# Patient Record
Sex: MALE | Race: OTHER | HISPANIC OR LATINO
[De-identification: names, ages, dates, MRNs, and addresses within clinical notes are randomized per-mention and may not be internally consistent; named-entity substitution may affect disease eponyms.]

---

## 2021-01-10 ENCOUNTER — FORM ENCOUNTER (OUTPATIENT)
Age: 31
End: 2021-01-10

## 2021-01-11 ENCOUNTER — TRANSCRIPTION ENCOUNTER (OUTPATIENT)
Age: 31
End: 2021-01-11

## 2021-01-11 PROBLEM — Z00.00 ENCOUNTER FOR PREVENTIVE HEALTH EXAMINATION: Status: ACTIVE | Noted: 2021-01-11

## 2021-01-14 ENCOUNTER — APPOINTMENT (OUTPATIENT)
Dept: DISASTER EMERGENCY | Facility: HOSPITAL | Age: 31
End: 2021-01-14

## 2021-01-14 ENCOUNTER — OUTPATIENT (OUTPATIENT)
Dept: OUTPATIENT SERVICES | Facility: HOSPITAL | Age: 31
LOS: 1 days | End: 2021-01-14
Payer: MEDICAID

## 2021-01-14 VITALS
OXYGEN SATURATION: 100 % | DIASTOLIC BLOOD PRESSURE: 68 MMHG | SYSTOLIC BLOOD PRESSURE: 108 MMHG | RESPIRATION RATE: 16 BRPM | HEART RATE: 76 BPM | TEMPERATURE: 97 F

## 2021-01-14 VITALS
TEMPERATURE: 98 F | RESPIRATION RATE: 17 BRPM | DIASTOLIC BLOOD PRESSURE: 69 MMHG | OXYGEN SATURATION: 100 % | HEIGHT: 69 IN | SYSTOLIC BLOOD PRESSURE: 107 MMHG | WEIGHT: 179.9 LBS | HEART RATE: 78 BPM

## 2021-01-14 DIAGNOSIS — U07.1 COVID-19: ICD-10-CM

## 2021-01-14 PROCEDURE — M0243: CPT

## 2021-01-14 NOTE — CHART NOTE - NSCHARTNOTEFT_GEN_A_CORE
Medicine NP note    CC: Monoclonal Antibody Infusion /COVID 19 Positive    30y F with pmhx of Seizures, Aids, on immunosupresive therapy,  presented for Monoclonal antibody treatment with Casirivimab and Imdevimab combination    Exam/findings:    Vital Signs Last 24 Hrs  T(C): 36.6 (14 Jan 2021 10:40), Max: 36.6 (14 Jan 2021 10:40)  T(F): 97.8 (14 Jan 2021 10:40), Max: 97.8 (14 Jan 2021 10:40)  HR: 70 (14 Jan 2021 10:40) (70 - 78)  BP: 106/70 (14 Jan 2021 10:40) (106/70 - 107/69)  BP(mean): --  RR: 16 (14 Jan 2021 10:40) (16 - 17)  SpO2: 100% (14 Jan 2021 10:40) (100% - 100%)    PE:   Appearance:  A&ox3, calm and cooperative	  HEENT:   Normal oral mucosa,   Lymphatic: No lymphadenopathy  Cardiovascular: Normal S1 S2, No JVD, No murmurs, No edema  Respiratory: Lungs clear to auscultation	  Gastrointestinal:  Soft, Non-tender, + BS	  Skin: warm and dry  Neurologic: Non-focal  Extremities: Normal range of motion B/L U/L extremities    ASSESSMENT:    7    Risk Profile includes: Patient High risk for covid 19 progression in setting of age >55    S/S: Cough, fever,  malaise, sore throat, soft BP     Covid 19 result positive on 01/05/2021    Plan:   - infusion procedure explained to patient   -Consent for monoclonal antibody infusion obtained  - Risk & benefits discussed/all questions answered  -infuse  Casirivimab and IMdevimab 1200mg/1200mg (total of 2400mg) IV over one hour   -observe patient for one hour post infusion    I have reviewed the Casirivimab and IMdevimab combination. Emergency Use Authorization (EUA) and I have provided the patient or patient's caregiver with the following information:    1. FDA has authorized emergency use of Casirivimab and IMdevimab combination., which is not an FDA-approved biological product.  2. The patient or patient's caregiver has the option to accept or refuse administration of Casirivimab and IMdevimab combination.   3. The significant known and potential risks and benefits of Casirivimab and IMdevimab combination. and the extent to which such risks and benefits are unknown.  4. Information on available alternative treatments and risks and benefits of those alternatives.      Discharge:     Patient tolerated infusion well, denies complaints of chest pain /SOB/dizziness/ palpitations  VSS stable for D/C home  D/C instructions given /fact sheet included  Patient to follow up with PMD as needed.    Red Virgen [facility]  Department of Medicine  [phone].      Electronic Signatures:  Red Virgen (JERSON)  (Signed 07-Jan-2021 18:48)  	Authored: Note Type, Note      Last Updated: 07-Jan-2021 18:48 by Red Virgen (JERSON) Medicine NP note    CC: Monoclonal Antibody Infusion /COVID 19 Positive    30y Male  with pmhx of Seizures, HIV, Aids, on immunosuppressive therapy,  presented for Monoclonal antibody treatment with Casirivimab and Imdevimab combination    Exam/findings:    Vital Signs Last 24 Hrs  T(C): 36.6 (14 Jan 2021 10:40), Max: 36.6 (14 Jan 2021 10:40)  T(F): 97.8 (14 Jan 2021 10:40), Max: 97.8 (14 Jan 2021 10:40)  HR: 70 (14 Jan 2021 10:40) (70 - 78)  BP: 106/70 (14 Jan 2021 10:40) (106/70 - 107/69)  BP(mean): --  RR: 16 (14 Jan 2021 10:40) (16 - 17)  SpO2: 100% (14 Jan 2021 10:40) (100% - 100%)    PE:   Appearance:  A&ox3, calm and cooperative	  HEENT:   Normal oral mucosa,   Lymphatic: No lymphadenopathy  Cardiovascular: Normal S1 S2, No JVD, No murmurs, No edema  Respiratory: Lungs clear to auscultation	  Gastrointestinal:  Soft, Non-tender, + BS	  Skin: warm and dry  Neurologic: Non-focal  Extremities: Normal range of motion B/L U/L extremities    ASSESSMENT:    30y Male  with pmhx of Seizures, HIV, Aids, on immunosuppressive therapy,  presented for Monoclonal antibody treatment with Casirivimab and Imdevimab combination    Risk Profile includes: Patient High risk for covid 19 progression in setting of AIDS, on immunosupressive therapy    S/S: Cough,  malaise     Covid 19 result positive on 01/08/2021, result reviewed, COvid 19 positive result in all sripts.    Plan:   - infusion procedure explained to patient   -Consent for monoclonal antibody infusion obtained  - Risk & benefits discussed/all questions answered  -infuse  Casirivimab and IMdevimab 1200mg/1200mg (total of 2400mg) IV over one hour   -observe patient for one hour post infusion    I have reviewed the Casirivimab and IMdevimab combination. Emergency Use Authorization (EUA) and I have provided the patient or patient's caregiver with the following information:    1. FDA has authorized emergency use of Casirivimab and IMdevimab combination., which is not an FDA-approved biological product.  2. The patient or patient's caregiver has the option to accept or refuse administration of Casirivimab and IMdevimab combination.   3. The significant known and potential risks and benefits of Casirivimab and IMdevimab combination. and the extent to which such risks and benefits are unknown.  4. Information on available alternative treatments and risks and benefits of those alternatives.    Discharge:     Patient tolerated infusion well, denies complaints of chest pain /SOB/dizziness/ palpitations  VSS stable for D/C home  D/C instructions given /fact sheet included  Patient to follow up with PMD as needed.    Red Virgen [facility]  Department of Medicine  [phone]. Medicine NP note    CC: Monoclonal Antibody Infusion /COVID 19 Positive    30y Male  with pmhx of Seizures, HIV, Aids, on immunosuppressive therapy, referred by Dr. Michael,  presented for Monoclonal antibody treatment with Casirivimab and Imdevimab combination    Exam/findings:    Vital Signs Last 24 Hrs  T(C): 36.6 (14 Jan 2021 11:15), Max: 36.6 (14 Jan 2021 10:40)  T(F): 97.8 (14 Jan 2021 11:15), Max: 97.8 (14 Jan 2021 10:40)  HR: 70 (14 Jan 2021 11:15) (70 - 88)  BP: 100/65 (14 Jan 2021 11:15) (100/65 - 112/81)  BP(mean): --  RR: 17 (14 Jan 2021 11:15) (16 - 17)  SpO2: 98% (14 Jan 2021 11:15) (98% - 100%)    PE:   Appearance:  A&ox3, calm and cooperative	  HEENT:   Normal oral mucosa,   Lymphatic: No lymphadenopathy  Cardiovascular: Normal S1 S2, No JVD, No murmurs, No edema  Respiratory: Lungs clear to auscultation	  Gastrointestinal:  Soft, Non-tender, + BS	  Skin: warm and dry  Neurologic: Non-focal  Extremities: Normal range of motion B/L U/L extremities    ASSESSMENT:    30y Male  with pmhx of Seizures, HIV, Aids, on immunosuppressive therapy, referred by Dr. Michael,  presented for Monoclonal antibody treatment with Casirivimab and Imdevimab combination    Risk Profile includes: Patient High risk for covid 19 progression in setting of AIDS, on immunosupressive therapy    S/S: Cough,  malaise     Covid 19 result positive on 01/08/2021, result reviewed, COvid 19 positive result in all sripts.    Plan:   - infusion procedure explained to patient   -Consent for monoclonal antibody infusion obtained  - Risk & benefits discussed/all questions answered  -infuse  Casirivimab and IMdevimab 1200mg/1200mg (total of 2400mg) IV over one hour   -observe patient for one hour post infusion    I have reviewed the Casirivimab and IMdevimab combination. Emergency Use Authorization (EUA) and I have provided the patient or patient's caregiver with the following information:    1. FDA has authorized emergency use of Casirivimab and IMdevimab combination., which is not an FDA-approved biological product.  2. The patient or patient's caregiver has the option to accept or refuse administration of Casirivimab and IMdevimab combination.   3. The significant known and potential risks and benefits of Casirivimab and IMdevimab combination. and the extent to which such risks and benefits are unknown.  4. Information on available alternative treatments and risks and benefits of those alternatives.    Discharge:     Patient tolerated infusion well, denies complaints of chest pain /SOB/dizziness/ palpitations  VSS stable for D/C home  D/C instructions given /fact sheet included  Patient to follow up with PMD as needed.    Red Virgen [facility]  Department of Medicine  [phone]. Medicine NP note    CC: Monoclonal Antibody Infusion /COVID 19 Positive    30y Male  with pmhx of Seizures, HIV, Aids, on immunosuppressive therapy, referred by Dr. Michael,  presented for Monoclonal antibody treatment with Casirivimab and Imdevimab combination    Exam/findings:    Vital Signs Last 24 Hrs  T(C): 36.6 (14 Jan 2021 11:15), Max: 36.6 (14 Jan 2021 10:40)  T(F): 97.8 (14 Jan 2021 11:15), Max: 97.8 (14 Jan 2021 10:40)  HR: 70 (14 Jan 2021 11:15) (70 - 88)  BP: 100/65 (14 Jan 2021 11:15) (100/65 - 112/81)  BP(mean): --  RR: 17 (14 Jan 2021 11:15) (16 - 17)  SpO2: 98% (14 Jan 2021 11:15) (98% - 100%)    PE:   Appearance:  A&ox3, calm and cooperative	  HEENT:   Normal oral mucosa,   Lymphatic: No lymphadenopathy  Cardiovascular: Normal S1 S2, No JVD, No murmurs, No edema  Respiratory: Lungs clear to auscultation	  Gastrointestinal:  Soft, Non-tender, + BS	  Skin: warm and dry  Neurologic: Non-focal  Extremities: Normal range of motion B/L U/L extremities    ASSESSMENT:    30y Male  with pmhx of Seizures, HIV, Aids, on immunosuppressive therapy, referred by Dr. Michael,  presented for Monoclonal antibody treatment with Casirivimab and Imdevimab combination    Risk Profile includes: Patient High risk for covid 19 progression in setting of AIDS, on immunosupressive therapy    S/S: Cough,  malaise     Covid 19 result positive on 01/08/2021, result reviewed, COvid 19 positive result in allscripts    Plan:   - infusion procedure explained to patient   -Consent for monoclonal antibody infusion obtained  - Risk & benefits discussed/all questions answered  -infuse  Casirivimab and IMdevimab 1200mg/1200mg (total of 2400mg) IV over one hour   -observe patient for one hour post infusion    I have reviewed the Casirivimab and IMdevimab combination. Emergency Use Authorization (EUA) and I have provided the patient or patient's caregiver with the following information:    1. FDA has authorized emergency use of Casirivimab and IMdevimab combination., which is not an FDA-approved biological product.  2. The patient or patient's caregiver has the option to accept or refuse administration of Casirivimab and IMdevimab combination.   3. The significant known and potential risks and benefits of Casirivimab and IMdevimab combination. and the extent to which such risks and benefits are unknown.  4. Information on available alternative treatments and risks and benefits of those alternatives.    Discharge:     Patient tolerated infusion well, denies complaints of chest pain /SOB/dizziness/ palpitations  VSS stable for D/C home  D/C instructions given /fact sheet included  Patient to follow up with PMD as needed.    Red Virgen [facility]  Department of Medicine  [phone]. Medicine NP note    CC: Monoclonal Antibody Infusion /COVID 19 Positive    30y Male  with pmhx of Seizures, HIV, Aids, on immunosuppressive therapy, referred by Dr. Michael,  presented for Monoclonal antibody treatment with Casirivimab and Imdevimab combination.  Pt symptomatic with cough, malaise.    Exam/findings:    Vital Signs Last 24 Hrs  T(C): 36.6 (14 Jan 2021 11:15), Max: 36.6 (14 Jan 2021 10:40)  T(F): 97.8 (14 Jan 2021 11:15), Max: 97.8 (14 Jan 2021 10:40)  HR: 70 (14 Jan 2021 11:15) (70 - 88)  BP: 100/65 (14 Jan 2021 11:15) (100/65 - 112/81)  BP(mean): --  RR: 17 (14 Jan 2021 11:15) (16 - 17)  SpO2: 98% (14 Jan 2021 11:15) (98% - 100%)    PE:   Appearance:  A&ox3, calm and cooperative	  HEENT:   Normal oral mucosa,   Lymphatic: No lymphadenopathy  Cardiovascular: Normal S1 S2, No JVD, No murmurs, No edema  Respiratory: Lungs clear to auscultation	  Gastrointestinal:  Soft, Non-tender, + BS	  Skin: warm and dry  Neurologic: Non-focal  Extremities: Normal range of motion B/L U/L extremities    ASSESSMENT:    30y Male  with pmhx of Seizures, HIV, Aids, on immunosuppressive therapy, referred by Dr. Michael,  presented for Monoclonal antibody treatment with Casirivimab and Imdevimab combination    Risk Profile includes: Patient High risk for covid 19 progression in setting of AIDS, on immunosupressive therapy    S/S: Cough,  malaise     Covid 19 result positive on 01/08/2021, result reviewed, COvid 19 positive result in allscripts, Hard copy of results in pt's physical chart.    Plan:   - infusion procedure explained to patient   -Consent for monoclonal antibody infusion obtained  - Risk & benefits discussed/all questions answered  -infuse  Casirivimab and IMdevimab 1200mg/1200mg (total of 2400mg) IV over one hour   -observe patient for one hour post infusion    I have reviewed the Casirivimab and IMdevimab combination. Emergency Use Authorization (EUA) and I have provided the patient or patient's caregiver with the following information:    1. FDA has authorized emergency use of Casirivimab and IMdevimab combination., which is not an FDA-approved biological product.  2. The patient or patient's caregiver has the option to accept or refuse administration of Casirivimab and IMdevimab combination.   3. The significant known and potential risks and benefits of Casirivimab and IMdevimab combination. and the extent to which such risks and benefits are unknown.  4. Information on available alternative treatments and risks and benefits of those alternatives.    Discharge:     Patient tolerated infusion well, denies complaints of chest pain /SOB/dizziness/ palpitations  VSS stable for D/C home  D/C instructions given /fact sheet included  Patient to follow up with PMD as needed.    Red Virgen [facility]  Department of Medicine  [phone]. Medicine NP note    CC: Monoclonal Antibody Infusion /COVID 19 Positive    30y Male  with pmhx of Seizures, HIV, Aids, on immunosuppressive therapy, referred by Dr. Michael,  presented for Monoclonal antibody treatment with Casirivimab and Imdevimab combination.    Pt symptomatic with cough, malaise.    Exam/findings:    Vital Signs Last 24 Hrs  T(C): 36.6 (14 Jan 2021 11:15), Max: 36.6 (14 Jan 2021 10:40)  T(F): 97.8 (14 Jan 2021 11:15), Max: 97.8 (14 Jan 2021 10:40)  HR: 70 (14 Jan 2021 11:15) (70 - 88)  BP: 100/65 (14 Jan 2021 11:15) (100/65 - 112/81)  BP(mean): --  RR: 17 (14 Jan 2021 11:15) (16 - 17)  SpO2: 98% (14 Jan 2021 11:15) (98% - 100%)    PE:   Appearance:  A&ox3, calm and cooperative	  HEENT:   Normal oral mucosa,   Lymphatic: No lymphadenopathy  Cardiovascular: Normal S1 S2, No JVD, No murmurs, No edema  Respiratory: Lungs clear to auscultation	  Gastrointestinal:  Soft, Non-tender, + BS	  Skin: warm and dry  Neurologic: Non-focal  Extremities: Normal range of motion B/L U/L extremities    ASSESSMENT:    30y Male  with pmhx of Seizures, HIV, Aids, on immunosuppressive therapy, referred by Dr. Michael,  presented for Monoclonal antibody treatment with Casirivimab and Imdevimab combination    Risk Profile includes: Patient High risk for covid 19 progression in setting of AIDS, on immunosupressive therapy    S/S: Cough,  malaise, symptoms satrted 5 days ago    Covid 19 result positive on 01/08/2021, result reviewed, COvid 19 positive result in allscripts, Hard copy of results in pt's physical chart.    Plan:   - infusion procedure explained to patient   -Consent for monoclonal antibody infusion obtained  - Risk & benefits discussed/all questions answered  -infuse  Casirivimab and IMdevimab 1200mg/1200mg (total of 2400mg) IV over one hour   -observe patient for one hour post infusion    I have reviewed the Casirivimab and IMdevimab combination. Emergency Use Authorization (EUA) and I have provided the patient or patient's caregiver with the following information:    1. FDA has authorized emergency use of Casirivimab and IMdevimab combination., which is not an FDA-approved biological product.  2. The patient or patient's caregiver has the option to accept or refuse administration of Casirivimab and IMdevimab combination.   3. The significant known and potential risks and benefits of Casirivimab and IMdevimab combination. and the extent to which such risks and benefits are unknown.  4. Information on available alternative treatments and risks and benefits of those alternatives.    Discharge: 12:44 PM    Patient tolerated infusion well, denies complaints of chest pain /SOB/dizziness/ palpitations  VSS stable for D/C home  D/C instructions given /fact sheet included  Patient to follow up with PMD as needed.    Red Virgen [facility]  Department of Medicine  [phone].

## 2021-01-15 ENCOUNTER — TRANSCRIPTION ENCOUNTER (OUTPATIENT)
Age: 31
End: 2021-01-15

## 2021-01-20 ENCOUNTER — TRANSCRIPTION ENCOUNTER (OUTPATIENT)
Age: 31
End: 2021-01-20

## 2024-03-04 ENCOUNTER — APPOINTMENT (OUTPATIENT)
Dept: NEUROLOGY | Facility: HOSPITAL | Age: 34
End: 2024-03-04

## 2024-03-04 ENCOUNTER — TRANSCRIPTION ENCOUNTER (OUTPATIENT)
Age: 34
End: 2024-03-04

## 2024-03-04 ENCOUNTER — INPATIENT (INPATIENT)
Facility: HOSPITAL | Age: 34
LOS: 3 days | Discharge: HOME | DRG: 24 | End: 2024-03-08
Attending: SPECIALIST | Admitting: SPECIALIST
Payer: MEDICAID

## 2024-03-04 VITALS
RESPIRATION RATE: 15 BRPM | OXYGEN SATURATION: 100 % | DIASTOLIC BLOOD PRESSURE: 71 MMHG | HEART RATE: 69 BPM | TEMPERATURE: 98 F | SYSTOLIC BLOOD PRESSURE: 105 MMHG

## 2024-03-04 DIAGNOSIS — I63.9 CEREBRAL INFARCTION, UNSPECIFIED: ICD-10-CM

## 2024-03-04 LAB
ANION GAP SERPL CALC-SCNC: 12 MMOL/L — SIGNIFICANT CHANGE UP (ref 5–17)
BUN SERPL-MCNC: 12.7 MG/DL — SIGNIFICANT CHANGE UP (ref 8–20)
CALCIUM SERPL-MCNC: 8.3 MG/DL — LOW (ref 8.4–10.5)
CHLORIDE SERPL-SCNC: 103 MMOL/L — SIGNIFICANT CHANGE UP (ref 96–108)
CO2 SERPL-SCNC: 24 MMOL/L — SIGNIFICANT CHANGE UP (ref 22–29)
CREAT SERPL-MCNC: 0.94 MG/DL — SIGNIFICANT CHANGE UP (ref 0.5–1.3)
EGFR: 111 ML/MIN/1.73M2 — SIGNIFICANT CHANGE UP
GLUCOSE BLDC GLUCOMTR-MCNC: 100 MG/DL — HIGH (ref 70–99)
GLUCOSE BLDC GLUCOMTR-MCNC: 94 MG/DL — SIGNIFICANT CHANGE UP (ref 70–99)
GLUCOSE SERPL-MCNC: 98 MG/DL — SIGNIFICANT CHANGE UP (ref 70–99)
POTASSIUM SERPL-MCNC: 3.6 MMOL/L — SIGNIFICANT CHANGE UP (ref 3.5–5.3)
POTASSIUM SERPL-SCNC: 3.6 MMOL/L — SIGNIFICANT CHANGE UP (ref 3.5–5.3)
SODIUM SERPL-SCNC: 139 MMOL/L — SIGNIFICANT CHANGE UP (ref 135–145)

## 2024-03-04 PROCEDURE — 61645 PERQ ART M-THROMBECT &/NFS: CPT | Mod: RT

## 2024-03-04 PROCEDURE — 76377 3D RENDER W/INTRP POSTPROCES: CPT | Mod: 26

## 2024-03-04 PROCEDURE — 70460 CT HEAD/BRAIN W/DYE: CPT | Mod: 26

## 2024-03-04 RX ORDER — POTASSIUM CHLORIDE 20 MEQ
40 PACKET (EA) ORAL ONCE
Refills: 0 | Status: COMPLETED | OUTPATIENT
Start: 2024-03-04 | End: 2024-03-04

## 2024-03-04 RX ORDER — EMTRICITABINE 200 MG/1
200 CAPSULE ORAL AT BEDTIME
Refills: 0 | Status: DISCONTINUED | OUTPATIENT
Start: 2024-03-04 | End: 2024-03-04

## 2024-03-04 RX ORDER — SODIUM CHLORIDE 9 MG/ML
1000 INJECTION INTRAMUSCULAR; INTRAVENOUS; SUBCUTANEOUS ONCE
Refills: 0 | Status: COMPLETED | OUTPATIENT
Start: 2024-03-04 | End: 2024-03-04

## 2024-03-04 RX ORDER — DARUNAVIR ETHANOLATE AND COBICISTAT 800; 150 MG/1; MG/1
1 TABLET, FILM COATED ORAL AT BEDTIME
Refills: 0 | Status: DISCONTINUED | OUTPATIENT
Start: 2024-03-04 | End: 2024-03-05

## 2024-03-04 RX ORDER — INSULIN LISPRO 100/ML
VIAL (ML) SUBCUTANEOUS EVERY 6 HOURS
Refills: 0 | Status: DISCONTINUED | OUTPATIENT
Start: 2024-03-04 | End: 2024-03-05

## 2024-03-04 RX ORDER — LEVETIRACETAM 250 MG/1
1000 TABLET, FILM COATED ORAL EVERY 12 HOURS
Refills: 0 | Status: DISCONTINUED | OUTPATIENT
Start: 2024-03-04 | End: 2024-03-05

## 2024-03-04 RX ORDER — EMTRICITABINE AND TENOFOVIR DISOPROXIL FUMARATE 200; 300 MG/1; MG/1
1 TABLET, FILM COATED ORAL AT BEDTIME
Refills: 0 | Status: DISCONTINUED | OUTPATIENT
Start: 2024-03-04 | End: 2024-03-05

## 2024-03-04 RX ORDER — DEXTROSE 50 % IN WATER 50 %
25 SYRINGE (ML) INTRAVENOUS ONCE
Refills: 0 | Status: DISCONTINUED | OUTPATIENT
Start: 2024-03-04 | End: 2024-03-05

## 2024-03-04 RX ORDER — DEXTROSE 50 % IN WATER 50 %
12.5 SYRINGE (ML) INTRAVENOUS ONCE
Refills: 0 | Status: DISCONTINUED | OUTPATIENT
Start: 2024-03-04 | End: 2024-03-05

## 2024-03-04 RX ORDER — TENOFOVIR DISOPROXIL FUMARATE 300 MG/1
25 TABLET, FILM COATED ORAL AT BEDTIME
Refills: 0 | Status: DISCONTINUED | OUTPATIENT
Start: 2024-03-04 | End: 2024-03-04

## 2024-03-04 RX ORDER — LACOSAMIDE 50 MG/1
150 TABLET ORAL
Refills: 0 | Status: DISCONTINUED | OUTPATIENT
Start: 2024-03-04 | End: 2024-03-05

## 2024-03-04 RX ORDER — CHLORHEXIDINE GLUCONATE 213 G/1000ML
1 SOLUTION TOPICAL
Refills: 0 | Status: DISCONTINUED | OUTPATIENT
Start: 2024-03-04 | End: 2024-03-08

## 2024-03-04 RX ADMIN — EMTRICITABINE AND TENOFOVIR DISOPROXIL FUMARATE 1 TABLET(S): 200; 300 TABLET, FILM COATED ORAL at 23:18

## 2024-03-04 RX ADMIN — Medication 40 MILLIEQUIVALENT(S): at 23:18

## 2024-03-04 RX ADMIN — SODIUM CHLORIDE 1000 MILLILITER(S): 9 INJECTION INTRAMUSCULAR; INTRAVENOUS; SUBCUTANEOUS at 23:55

## 2024-03-04 RX ADMIN — DARUNAVIR ETHANOLATE AND COBICISTAT 1 TABLET(S): 800; 150 TABLET, FILM COATED ORAL at 23:18

## 2024-03-04 NOTE — CHART NOTE - NSCHARTNOTEFT_GEN_A_CORE
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 Mouna MD   Fellow: Dr. Tani DO  Physician Assistant:  Bailey Ahuja  Nurse: Radha Parr  Anesthesiologist: Dr Purvis                                           Radiology Tech: Jimbo Ford        Sheath: 6 - [place] Sheath       I/Os: estimated blood loss less than 50cc,  IV fluids    400cc,   Urine out put    cc,   Contrast: Omnipaque 240     cc, Fluoro Time:         Vitals :  /74  HR 67  Spo2  100%         Preliminary Report:     Under a 6 [place]ian long sheath via the right groin under general anesthesia  a selective cerebral angiography  was performed of the right internal carotid artery and reveals R Distal M1 Occlusion s/p TICI 3 Recanalization with 1 Aspiration pass    Right groin sheath was discontinued at 18:47    Hemostasis was obtained with approximately 13 minutes of manual compression.      No active bleeding, no hematoma, no ecchymosis was noted following manual compression and quick clot and safeguard balloon dressing applied at 19:00    Patient transferred to the NSCU in stable condition.   Patient tolerated procedure well.  Patient remains hemodynamically stable, NIH post procedure registering at 2  Results were discussed with patient and patient's family.

## 2024-03-04 NOTE — DISCHARGE NOTE NURSING/CASE MANAGEMENT/SOCIAL WORK - PATIENT PORTAL LINK FT
You can access the FollowMyHealth Patient Portal offered by [facility] by registering at the following website: [email or web address]. By joining [email or web address] FollowMyHealth portal, you will also be able to view your health information using other applications (apps) compatible with our system.

## 2024-03-04 NOTE — PATIENT PROFILE ADULT - FALL HARM RISK - HARM RISK INTERVENTIONS

## 2024-03-04 NOTE — H&P ADULT - ASSESSMENT
ASSESSMENT/PLAN:    NEURO:  Neuro/Vital checks per TNK/Thrombectomy protocol  CT AM and @ 1437 for 24hr TNK scan  MR @ somepoint  Keppra 1g BID, Vimpat 150mg BID  Pain control- PRN  Activity: [x] OOB as tolerated [] Bedrest [x] PT [x] OT [] PMNR    PULM: RA  Incentive Spirometry as tolerated    CV:  SBP goal:     RENAL:  Fluids: NS @ 60    GI:  Diet: NPO except meds  GI prophylaxis [x] not indicated [] PPI [] other:  Bowel regimen [] colace [x] senna [] other: miralax    ENDO:   Goal euglycemia (-180)  ISS    HEME/ONC:  VTE prophylaxis: [x] SCDs [x] hold chemoprophylaxis for TNK    ID: HIV + on Symtuza, Do not stock will be giving medication individually, tenofavir dose is 10mg in combination, spoke with pharmacy about only having 25mg, will give 25mg until patient can present own medication for adminstration    Afebrile  Monitor Fever Curve    SOCIAL/FAMILY:  [x] updated at bedside [] family meeting    CODE STATUS:  [x] Full Code [] DNR [] DNI [] Palliative/Comfort Care    DISPOSITION:  [x] ICU [] Stroke Unit [] Floor [] EMU [] RCU [] PCU    [x] Patient is at high risk of neurologic deterioration/death due to: Cerebral edema, Cerebral herniation    Time seen: 60  Time spent: 45 [x] critical care minutes

## 2024-03-04 NOTE — CHART NOTE - NSCHARTNOTEFT_GEN_A_CORE
Neurointerventional Surgery  Pre-Procedure Note     HPI:    This is a 33y right hand dominant Male with prior history of stroke who collapsed in the parking lot [facility] [place] [facility] earlier this afternoon. Patient suffered left sided hemiparesis and was registering an NIH of 15 that prompted TNK administration. On imaging patient was found to have a 1cm  focal nonocclusive thrombus versus severe focal stenosis in the proximal M1 segment of the R MCA and 1 cm severe focal stenosis of the distal R M1. There is suspected tandem LVO in the R ICA and MCA. Patient to undergo emergent mechanical thrombectomy with Dr. Argueta on the evening of 3/4/24. Consent obtained from Chela (patients wife).      Allergies: No Known Allergies      PMH/PSH:  PAST MEDICAL & SURGICAL HISTORY:      Social History:   Social History:    FAMILY HISTORY:      Current Medications:       Physical Exam:  Constitutional: NAD    Neuro  * Mental Status:  GCS 15:  E(4), V(5), M(6).  Awake, alert, oriented to conversation.  * Cranial Nerves: L Facial droop. PERRL, EOMI, tongue midline, no gaze deviation  * Motor: RUE 4/5, LUE 4/5, RLE 3/5, LLE 5/5  * Sensory: Sensation diminished to light touch on the Left  * Reflexes: Not assessed  * Gait: Not assessed    Cardiovascular:  S1, S2 no murmurs appreciated.  Regular rate and rhythm.  Eyes: See neurologic examination with detailed examination of eyes.  ENT: No JVD, Trachea Midline.  Respiratory: Clear to auscultation.  Gastrointestinal: Soft, nontender, nondistended.  Genitourinary: [ ] Ventura, [ x ] No Ventura.   Musculoskeletal: No muscle wasting noted,  No pretibial edema appreciated, no appreciable calf tenderness.  Skin:  Wound inspected, no redness, bleeding or drainage noted.    Hematologic / Lymph / Immunologic: No bleeding from IV sites or wounds, No lymphadenopathy, No Hives or allergic type skin lesions      NIH SS: 14  DATE: 3/4/24  TIME: 18:12  1A: Level of consciousness (0-3): 0  1B: Questions (0-2): 0    1C: Commands (0-2): 0  2: Gaze (0-2): 1  3: Visual fields (0-3): 2  4: Facial palsy (0-3): 2  MOTOR:  5A: Left arm motor drift (0-4): 1  5B: Right arm motor drift (0-4): 1  6A: Left leg motor drift (0-4): 2  6B: Right leg motor drift (0-4): 1  7: Limb ataxia (0-2): 1  SENSORY:  8: Sensation (0-2): 1  SPEECH:  9: Language (0-3): 0  10: Dysarthria (0-2): 1  EXTINCTION:  11: Extinction/inattention (0-2): 1    TOTAL SCORE:       Assessment/Plan:   This is a 33y  year old right hand dominant Male  who presents to neuro-IR for selective cerebral angiography.     Procedure, goals, risks, benefits and alternatives  were discussed with patient.  All questions were answered. Risks include but are not limited to stroke, vessel injury, hemorrhage, and or groin hematoma.  Patient and patients wife demonstrates understanding  of all risks involved with this procedure and wishes to continue.  Appropriate consent was obtained from patient and consent is in the patient's chart.

## 2024-03-04 NOTE — H&P ADULT - HISTORY OF PRESENT ILLNESS
HPI: 33 year old male PMHx of Seizure disorder, previous CVA, HIV was AOX3 in [facility] emergency room awaiting transfer to [facility] for cva management after seizure in parking lot of  [facility] witnessed by security ( supported and placed on ground by same-no fall/trauma) patient found on awakening to have left sided paralysis , cat scan showed stroke/2 large vessel occlusions received TNK by [facility], prior to transfer patient moving left side arm and leg weakly now    Pre-MRS-0  GCS 15  NIHSS: 14  DATE: 3/4/24  TIME: 18:12  1A: Level of consciousness (0-3): 0  1B: Questions (0-2): 0  1C: Commands (0-2): 0  2: Gaze (0-2): 1  3: Visual fields (0-3): 2  4: Facial palsy (0-3): 2  MOTOR:  5A: Left arm motor drift (0-4): 1  5B: Right arm motor drift (0-4): 1  6A: Left leg motor drift (0-4): 2  6B: Right leg motor drift (0-4): 1  7: Limb ataxia (0-2): 1  SENSORY:  8: Sensation (0-2): 1  SPEECH:  9: Language (0-3): 0  10: Dysarthria (0-2): 1  EXTINCTION:  11: Extinction/inattention (0-2): 1     TOTAL SCORE: 14    INTERVAL HPI/OVERNIGHT EVENTS:  POD 0 TICI 3 thrombectomy. TNK @ 14:37    Vital Signs Last 24 Hrs  T(C): 36.7 (04 Mar 2024 19:21), Max: 36.7 (04 Mar 2024 19:21)  T(F): 98 (04 Mar 2024 19:21), Max: 98 (04 Mar 2024 19:21)  HR: 67 (04 Mar 2024 20:30) (67 - 75)  BP: 96/60 (04 Mar 2024 20:30) (96/60 - 107/60)  BP(mean): 70 (04 Mar 2024 20:30) (67 - 82)  RR: 14 (04 Mar 2024 20:30) (12 - 19)  SpO2: 100% (04 Mar 2024 20:30) (100% - 100%)    Parameters below as of 04 Mar 2024 20:00  Patient On (Oxygen Delivery Method): nasal cannula        PHYSICAL EXAM:  GENERAL: no acute distress  HEAD:  Atraumatic, normocephalic  NEURO:  AOx3, FC, TRAORE spont AG  CHEST/LUNG: Clear to auscultation bilaterally; no rales, rhonchi, wheezing, or rubs  ABDOMEN: Soft, nontender, nondistended;   EXTREMITIES:  2+ peripheral pulses, no clubbing, cyanosis, or edema  SKIN: Warm, dry; no rashes or lesions      LABS:    03-04    139  |  103  |  12.7  ----------------------------<  98  3.6   |  24.0  |  0.94    Ca    8.3<L>      04 Mar 2024 20:05        Urinalysis Basic - ( 04 Mar 2024 20:05 )    Color: x / Appearance: x / SG: x / pH: x  Gluc: 98 mg/dL / Ketone: x  / Bili: x / Urobili: x   Blood: x / Protein: x / Nitrite: x   Leuk Esterase: x / RBC: x / WBC x   Sq Epi: x / Non Sq Epi: x / Bacteria: x            RADIOLOGY & ADDITIONAL TESTS:

## 2024-03-05 ENCOUNTER — RESULT REVIEW (OUTPATIENT)
Age: 34
End: 2024-03-05

## 2024-03-05 DIAGNOSIS — I63.9 CEREBRAL INFARCTION, UNSPECIFIED: ICD-10-CM

## 2024-03-05 LAB
A1C WITH ESTIMATED AVERAGE GLUCOSE RESULT: 4.5 % — SIGNIFICANT CHANGE UP (ref 4–5.6)
ANION GAP SERPL CALC-SCNC: 11 MMOL/L — SIGNIFICANT CHANGE UP (ref 5–17)
BUN SERPL-MCNC: 9.3 MG/DL — SIGNIFICANT CHANGE UP (ref 8–20)
CALCIUM SERPL-MCNC: 8.4 MG/DL — SIGNIFICANT CHANGE UP (ref 8.4–10.5)
CHLORIDE SERPL-SCNC: 105 MMOL/L — SIGNIFICANT CHANGE UP (ref 96–108)
CO2 SERPL-SCNC: 23 MMOL/L — SIGNIFICANT CHANGE UP (ref 22–29)
CREAT SERPL-MCNC: 0.82 MG/DL — SIGNIFICANT CHANGE UP (ref 0.5–1.3)
EGFR: 120 ML/MIN/1.73M2 — SIGNIFICANT CHANGE UP
ESTIMATED AVERAGE GLUCOSE: 82 MG/DL — SIGNIFICANT CHANGE UP (ref 68–114)
FSP PPP-MCNC: < 5 UG/ML — SIGNIFICANT CHANGE UP
GLUCOSE BLDC GLUCOMTR-MCNC: 95 MG/DL — SIGNIFICANT CHANGE UP (ref 70–99)
GLUCOSE SERPL-MCNC: 95 MG/DL — SIGNIFICANT CHANGE UP (ref 70–99)
HCT VFR BLD CALC: 33.8 % — LOW (ref 39–50)
HCT VFR BLD CALC: 36 % — LOW (ref 39–50)
HGB BLD-MCNC: 11.9 G/DL — LOW (ref 13–17)
HGB BLD-MCNC: 12.7 G/DL — LOW (ref 13–17)
HMPV RNA SPEC QL NAA+PROBE: DETECTED
MAGNESIUM SERPL-MCNC: 1.9 MG/DL — SIGNIFICANT CHANGE UP (ref 1.6–2.6)
MAGNESIUM SERPL-MCNC: 2.3 MG/DL — SIGNIFICANT CHANGE UP (ref 1.6–2.6)
MCHC RBC-ENTMCNC: 32.1 PG — SIGNIFICANT CHANGE UP (ref 27–34)
MCHC RBC-ENTMCNC: 32.2 PG — SIGNIFICANT CHANGE UP (ref 27–34)
MCHC RBC-ENTMCNC: 35.2 GM/DL — SIGNIFICANT CHANGE UP (ref 32–36)
MCHC RBC-ENTMCNC: 35.3 GM/DL — SIGNIFICANT CHANGE UP (ref 32–36)
MCV RBC AUTO: 91.1 FL — SIGNIFICANT CHANGE UP (ref 80–100)
MCV RBC AUTO: 91.1 FL — SIGNIFICANT CHANGE UP (ref 80–100)
MRSA PCR RESULT.: SIGNIFICANT CHANGE UP
PHOSPHATE SERPL-MCNC: 3.8 MG/DL — SIGNIFICANT CHANGE UP (ref 2.4–4.7)
PHOSPHATE SERPL-MCNC: 3.9 MG/DL — SIGNIFICANT CHANGE UP (ref 2.4–4.7)
PLATELET # BLD AUTO: 177 K/UL — SIGNIFICANT CHANGE UP (ref 150–400)
PLATELET # BLD AUTO: 189 K/UL — SIGNIFICANT CHANGE UP (ref 150–400)
POTASSIUM SERPL-MCNC: 3.9 MMOL/L — SIGNIFICANT CHANGE UP (ref 3.5–5.3)
POTASSIUM SERPL-SCNC: 3.9 MMOL/L — SIGNIFICANT CHANGE UP (ref 3.5–5.3)
RAPID RVP RESULT: DETECTED
RBC # BLD: 3.71 M/UL — LOW (ref 4.2–5.8)
RBC # BLD: 3.95 M/UL — LOW (ref 4.2–5.8)
RBC # FLD: 12 % — SIGNIFICANT CHANGE UP (ref 10.3–14.5)
RBC # FLD: 12.1 % — SIGNIFICANT CHANGE UP (ref 10.3–14.5)
S AUREUS DNA NOSE QL NAA+PROBE: SIGNIFICANT CHANGE UP
SARS-COV-2 RNA SPEC QL NAA+PROBE: SIGNIFICANT CHANGE UP
SODIUM SERPL-SCNC: 139 MMOL/L — SIGNIFICANT CHANGE UP (ref 135–145)
T3 SERPL-MCNC: 98 NG/DL — SIGNIFICANT CHANGE UP (ref 80–200)
T4 AB SER-ACNC: 6.6 UG/DL — SIGNIFICANT CHANGE UP (ref 4.5–12)
TSH SERPL-MCNC: 4.24 UIU/ML — HIGH (ref 0.27–4.2)
WBC # BLD: 5.34 K/UL — SIGNIFICANT CHANGE UP (ref 3.8–10.5)
WBC # BLD: 5.35 K/UL — SIGNIFICANT CHANGE UP (ref 3.8–10.5)
WBC # FLD AUTO: 5.34 K/UL — SIGNIFICANT CHANGE UP (ref 3.8–10.5)
WBC # FLD AUTO: 5.35 K/UL — SIGNIFICANT CHANGE UP (ref 3.8–10.5)

## 2024-03-05 PROCEDURE — 93306 TTE W/DOPPLER COMPLETE: CPT | Mod: 26

## 2024-03-05 PROCEDURE — 93010 ELECTROCARDIOGRAM REPORT: CPT

## 2024-03-05 PROCEDURE — 99255 IP/OBS CONSLTJ NEW/EST HI 80: CPT

## 2024-03-05 PROCEDURE — 93356 MYOCRD STRAIN IMG SPCKL TRCK: CPT

## 2024-03-05 PROCEDURE — 71045 X-RAY EXAM CHEST 1 VIEW: CPT | Mod: 26

## 2024-03-05 PROCEDURE — 70553 MRI BRAIN STEM W/O & W/DYE: CPT | Mod: 26

## 2024-03-05 PROCEDURE — 99222 1ST HOSP IP/OBS MODERATE 55: CPT | Mod: 25

## 2024-03-05 PROCEDURE — 99223 1ST HOSP IP/OBS HIGH 75: CPT | Mod: GC

## 2024-03-05 PROCEDURE — 99291 CRITICAL CARE FIRST HOUR: CPT

## 2024-03-05 PROCEDURE — 93970 EXTREMITY STUDY: CPT | Mod: 26

## 2024-03-05 PROCEDURE — 70450 CT HEAD/BRAIN W/O DYE: CPT | Mod: 26

## 2024-03-05 RX ORDER — DORAVIRINE 100 MG/1
100 TABLET, FILM COATED ORAL DAILY
Refills: 0 | Status: DISCONTINUED | OUTPATIENT
Start: 2024-03-05 | End: 2024-03-08

## 2024-03-05 RX ORDER — LACOSAMIDE 50 MG/1
1 TABLET ORAL
Refills: 0 | DISCHARGE

## 2024-03-05 RX ORDER — LEVETIRACETAM 250 MG/1
1000 TABLET, FILM COATED ORAL DAILY
Refills: 0 | Status: DISCONTINUED | OUTPATIENT
Start: 2024-03-06 | End: 2024-03-06

## 2024-03-05 RX ORDER — PANTOPRAZOLE SODIUM 20 MG/1
40 TABLET, DELAYED RELEASE ORAL
Refills: 0 | Status: DISCONTINUED | OUTPATIENT
Start: 2024-03-05 | End: 2024-03-07

## 2024-03-05 RX ORDER — ASPIRIN/CALCIUM CARB/MAGNESIUM 324 MG
81 TABLET ORAL DAILY
Refills: 0 | Status: DISCONTINUED | OUTPATIENT
Start: 2024-03-05 | End: 2024-03-06

## 2024-03-05 RX ORDER — POTASSIUM CHLORIDE 20 MEQ
20 PACKET (EA) ORAL ONCE
Refills: 0 | Status: COMPLETED | OUTPATIENT
Start: 2024-03-05 | End: 2024-03-05

## 2024-03-05 RX ORDER — SODIUM CHLORIDE 9 MG/ML
10 INJECTION INTRAMUSCULAR; INTRAVENOUS; SUBCUTANEOUS ONCE
Refills: 0 | Status: COMPLETED | OUTPATIENT
Start: 2024-03-05 | End: 2024-03-05

## 2024-03-05 RX ORDER — SODIUM CHLORIDE 9 MG/ML
500 INJECTION INTRAMUSCULAR; INTRAVENOUS; SUBCUTANEOUS ONCE
Refills: 0 | Status: COMPLETED | OUTPATIENT
Start: 2024-03-05 | End: 2024-03-05

## 2024-03-05 RX ORDER — SODIUM CHLORIDE 9 MG/ML
1000 INJECTION INTRAMUSCULAR; INTRAVENOUS; SUBCUTANEOUS
Refills: 0 | Status: DISCONTINUED | OUTPATIENT
Start: 2024-03-06 | End: 2024-03-06

## 2024-03-05 RX ORDER — ACETAMINOPHEN 500 MG
975 TABLET ORAL EVERY 6 HOURS
Refills: 0 | Status: DISCONTINUED | OUTPATIENT
Start: 2024-03-05 | End: 2024-03-08

## 2024-03-05 RX ORDER — ENOXAPARIN SODIUM 100 MG/ML
40 INJECTION SUBCUTANEOUS
Refills: 0 | Status: DISCONTINUED | OUTPATIENT
Start: 2024-03-05 | End: 2024-03-06

## 2024-03-05 RX ORDER — POLYETHYLENE GLYCOL 3350 17 G/17G
17 POWDER, FOR SOLUTION ORAL DAILY
Refills: 0 | Status: DISCONTINUED | OUTPATIENT
Start: 2024-03-05 | End: 2024-03-08

## 2024-03-05 RX ORDER — LEVETIRACETAM 250 MG/1
1000 TABLET, FILM COATED ORAL DAILY
Refills: 0 | Status: DISCONTINUED | OUTPATIENT
Start: 2024-03-05 | End: 2024-03-05

## 2024-03-05 RX ORDER — LACOSAMIDE 50 MG/1
150 TABLET ORAL
Refills: 0 | Status: DISCONTINUED | OUTPATIENT
Start: 2024-03-05 | End: 2024-03-08

## 2024-03-05 RX ORDER — BENZOCAINE AND MENTHOL 5; 1 G/100ML; G/100ML
1 LIQUID ORAL
Refills: 0 | Status: DISCONTINUED | OUTPATIENT
Start: 2024-03-05 | End: 2024-03-08

## 2024-03-05 RX ORDER — DARUNAVIR, COBICISTAT, EMTRICITABINE, AND TENOFOVIR ALAFENAMIDE 800; 150; 200; 10 MG/1; MG/1; MG/1; MG/1
1 TABLET, FILM COATED ORAL DAILY
Refills: 0 | Status: DISCONTINUED | OUTPATIENT
Start: 2024-03-05 | End: 2024-03-08

## 2024-03-05 RX ORDER — LEVETIRACETAM 250 MG/1
1500 TABLET, FILM COATED ORAL AT BEDTIME
Refills: 0 | Status: DISCONTINUED | OUTPATIENT
Start: 2024-03-05 | End: 2024-03-06

## 2024-03-05 RX ORDER — MAGNESIUM SULFATE 500 MG/ML
2 VIAL (ML) INJECTION ONCE
Refills: 0 | Status: COMPLETED | OUTPATIENT
Start: 2024-03-05 | End: 2024-03-05

## 2024-03-05 RX ORDER — LEVETIRACETAM 250 MG/1
1500 TABLET, FILM COATED ORAL AT BEDTIME
Refills: 0 | Status: DISCONTINUED | OUTPATIENT
Start: 2024-03-05 | End: 2024-03-05

## 2024-03-05 RX ORDER — SENNA PLUS 8.6 MG/1
2 TABLET ORAL AT BEDTIME
Refills: 0 | Status: DISCONTINUED | OUTPATIENT
Start: 2024-03-05 | End: 2024-03-08

## 2024-03-05 RX ORDER — SODIUM CHLORIDE 9 MG/ML
1000 INJECTION INTRAMUSCULAR; INTRAVENOUS; SUBCUTANEOUS
Refills: 0 | Status: DISCONTINUED | OUTPATIENT
Start: 2024-03-05 | End: 2024-03-05

## 2024-03-05 RX ORDER — LEVETIRACETAM 250 MG/1
0 TABLET, FILM COATED ORAL
Refills: 0 | DISCHARGE

## 2024-03-05 RX ADMIN — CHLORHEXIDINE GLUCONATE 1 APPLICATION(S): 213 SOLUTION TOPICAL at 05:05

## 2024-03-05 RX ADMIN — LACOSAMIDE 150 MILLIGRAM(S): 50 TABLET ORAL at 17:18

## 2024-03-05 RX ADMIN — DORAVIRINE 100 MILLIGRAM(S): 100 TABLET, FILM COATED ORAL at 21:03

## 2024-03-05 RX ADMIN — Medication 975 MILLIGRAM(S): at 10:23

## 2024-03-05 RX ADMIN — SODIUM CHLORIDE 1000 MILLILITER(S): 9 INJECTION INTRAMUSCULAR; INTRAVENOUS; SUBCUTANEOUS at 14:02

## 2024-03-05 RX ADMIN — Medication 20 MILLIEQUIVALENT(S): at 05:05

## 2024-03-05 RX ADMIN — SENNA PLUS 2 TABLET(S): 8.6 TABLET ORAL at 21:02

## 2024-03-05 RX ADMIN — SODIUM CHLORIDE 10 MILLILITER(S): 9 INJECTION INTRAMUSCULAR; INTRAVENOUS; SUBCUTANEOUS at 10:25

## 2024-03-05 RX ADMIN — Medication 25 GRAM(S): at 01:36

## 2024-03-05 RX ADMIN — LEVETIRACETAM 1500 MILLIGRAM(S): 250 TABLET, FILM COATED ORAL at 21:05

## 2024-03-05 RX ADMIN — Medication 975 MILLIGRAM(S): at 02:26

## 2024-03-05 RX ADMIN — ENOXAPARIN SODIUM 40 MILLIGRAM(S): 100 INJECTION SUBCUTANEOUS at 21:04

## 2024-03-05 RX ADMIN — Medication 975 MILLIGRAM(S): at 09:23

## 2024-03-05 RX ADMIN — DARUNAVIR, COBICISTAT, EMTRICITABINE, AND TENOFOVIR ALAFENAMIDE 1 TABLET(S): 800; 150; 200; 10 TABLET, FILM COATED ORAL at 21:03

## 2024-03-05 RX ADMIN — Medication 975 MILLIGRAM(S): at 01:29

## 2024-03-05 RX ADMIN — LACOSAMIDE 150 MILLIGRAM(S): 50 TABLET ORAL at 05:06

## 2024-03-05 RX ADMIN — BENZOCAINE AND MENTHOL 1 LOZENGE: 5; 1 LIQUID ORAL at 05:32

## 2024-03-05 RX ADMIN — LEVETIRACETAM 1000 MILLIGRAM(S): 250 TABLET, FILM COATED ORAL at 05:06

## 2024-03-05 NOTE — CONSULT NOTE ADULT - ATTENDING COMMENTS
Patient seen and examined. Case discussed with Dr. Duran. Agree with recommendations.     Rehab/Impaired mobility and function - Patient continues to require hospitalization for the above diagnoses and ongoing active management of comorbid complications (ICU level care, therapy evals, CVA workup) that are substantially impairing functional ability and impairing quality of life.     RECOMMEND - OOB daily       When medically optimized, based on the patient's examination, expect patient to achieve functional goals for DC HOME.      Will continue to follow. Rehab recommendations are dependent on how functional progress changes as well as how patient continues to participate and tolerate therapeutic interventions, which may change. Recommend ongoing mobilization by staff to maintain cardiopulmonary function and prevention of secondary complications related to debility. Discussed the specific management and recommendations above with rehab clinical care team/rehab liaison.      Total Time Spent on Encounter (reviewing clinical notes, labs, radiology, medications, patient history/exam, assessment and plan) - 75 minutes

## 2024-03-05 NOTE — PROGRESS NOTE ADULT - ASSESSMENT
Assessment:  32 y/o M with acute CVA due to R ICA terminus and RM1and RM2 occlusion, s/p TNK and TICI 3 thrombectomy. Etiology - most likely cardioembolic. MRI reviewed - small-moderate R MCA territory stroke noted, no signs of hemorrhagic transversion.   PMH of remote CVA with L eye visual field deficit, epilepsy on Vimpat and Keppra, HIV.  Cardiomyopathy with aneurysmal apex, borderline LV EF; intracardiac mobile thrombus (apex).  Normocytic anemia, stable.    Plan:  -neurochecks q1h for now  -high risk of recurrent stroke in view of mobile intracardiac thrombus in the context of small-moderate size of acute stroke, > 24 hrs after stroke onset and TNK administration; after discussion with patient himself, we all agree that there's a high risk of recurrent, potentially disabling/life-threatening stroke, and benefit of AC initiation is considered lower than the risk of its potential complications; starting Heparin ggt - no bolus, PTT goal 60-80;  -obtain hypercoag w/up and coags prior to Heparin administration   -stability CTh in am  -hold ASA for now  -Cardiology involved  -rest of the management per daytime team

## 2024-03-05 NOTE — CONSULT NOTE ADULT - SUBJECTIVE AND OBJECTIVE BOX
HPI:  33yM w/ PMH seizure disorder, CVA, and HIV (on Symtuza), who suffered [facility] collapse [facility] 03/04/2024. Found to have right ICA and distal M1 occlusion. TNK given. Transferred to [facility]. S/p mechanical thrombectomy w/ TICI 3. PM&R consulted 03/05 for rehab disposition.    Imaging Reviewed Today:  CT head 03/05  ----------------------------------------------------------------------------------------  CHIEF COMPLAINT          ----------------------------------------------------------------------------------------  VITALS  T(C): 36.8 (03-05-24 @ 07:34), Max: 36.9 (03-05-24 @ 00:03)  HR: 75 (03-05-24 @ 07:00) (62 - 81)  BP: 94/60 (03-05-24 @ 07:00) (83/59 - 107/60)  RR: 18 (03-05-24 @ 07:00) (11 - 19)  SpO2: 97% (03-05-24 @ 07:00) (94% - 100%)  Wt(kg): --    PAST MEDICAL & SURGICAL HISTORY        LABS  REVIEWED    CBC Full  -  ( 05 Mar 2024 03:10 )  WBC Count : 5.34 K/uL  RBC Count : 3.71 M/uL  Hemoglobin : 11.9 g/dL  Hematocrit : 33.8 %  Platelet Count - Automated : 177 K/uL  Mean Cell Volume : 91.1 fl  Mean Cell Hemoglobin : 32.1 pg  Mean Cell Hemoglobin Concentration : 35.2 gm/dL    139  |  105  |  9.3  ----------------------------<  95  3.9   |  23.0  |  0.82    Ca    8.4      05 Mar 2024 03:10  Phos  3.8     03-05  Mg     2.3     03-05    CT head 03/05: No evidence of acute transcortical infarct, acute intracranial hemorrhage, or mass effect.      ALLERGIES  No Known Allergies      MEDICATIONS   acetaminophen     Tablet .. 975 milliGRAM(s) Oral every 6 hours PRN  benzocaine/menthol Lozenge 1 Lozenge Oral every 3 hours PRN  chlorhexidine 2% Cloths 1 Application(s) Topical <User Schedule>  darunavir 800 mG/cobicstat 150 mG 1 Tablet(s) Oral at bedtime  dextrose 50% Injectable 25 Gram(s) IV Push once  dextrose 50% Injectable 25 Gram(s) IV Push once  dextrose 50% Injectable 12.5 Gram(s) IV Push once  emtricitabine 200 mG/tenofovir alafenamide 25 mG (DESCOVY) Tablet 1 Tablet(s) Oral at bedtime  insulin lispro (ADMELOG) corrective regimen sliding scale   SubCutaneous every 6 hours  lacosamide 150 milliGRAM(s) Oral two times a day  levETIRAcetam   Injectable 1000 milliGRAM(s) IV Push every 12 hours  polyethylene glycol 3350 17 Gram(s) Oral daily  senna 2 Tablet(s) Oral at bedtime  sodium chloride 0.9%. 1000 milliLiter(s) IV Continuous <Continuous>  ----------------------------------------------------------------------------------------  FUNCTIONAL HISTORY  Lives   Independent    CURRENT FUNCTIONAL STATUS  Therapy evals pending.   ----------------------------------------------------------------------------------------  PHYSICAL EXAM  Constitutional - NAD, Comfortable  HEENT - NCAT, EOMI  Neck - Supple, No limited ROM  Chest - Breathing comfortably, No wheezing  Cardiovascular - S1S2   Abdomen - Soft   Extremities - No C/C/E, No calf tenderness   Neurologic Exam -                    Cognitive - AAO to self, place, date, year, situation     Communication - Fluent, No dysarthria     Cranial Nerves - CN 2-12 intact     FUNCTIONAL MOTOR EXAM - No focal deficits                    LEFT    UE - ShAB 5/5, EF 5/5, EE 5/5, WE 5/5,  5/5                    RIGHT UE - ShAB 5/5, EF 5/5, EE 5/5, WE 5/5,  5/5                    LEFT    LE - HF 5/5, KE 5/5, DF 5/5, PF 5/5                    RIGHT LE - HF 5/5, KE 5/5, DF 5/5, PF 5/5        Sensory - Intact to LT     Reflexes - DTR Intact, No primitive reflexive     Coordination - FTN intact     OculoVestibular - No saccades, No nystagmus, VOR         Balance - WNL Static  Psychiatric - Mood stable, Affect WNL  ----------------------------------------------------------------------------------------  ASSESSMENT/PLAN  33yMale w/ PMH CVA, HIV, seizure disorder, who presents w/ functional deficits after right MCA CVA.  Pain - acetaminophen PRN  Seizure disorder - levetiracetam IV, lacosamide  HIV - closest equivalent of Symtuza until home med available  DVT PPX - SCDs (s/p TNK)  Impaired Mobility/Function/Rehab Recommendations -  HPI:  33yM w/ PMH seizure disorder and HIV (on Symtuza), who was driven to [facility] 03/04/2024 by girlfriend for decreased responsiveness and left-sided weakness and numbness. Suffered witnessed collapse followed by seizure in [facility] parking lot. Found to have right ICA and distal M1 occlusion. TNK given. Transferred to [facility]. S/p mechanical thrombectomy w/ TICI 3. PM&R consulted 03/05 for rehab disposition.    Imaging Reviewed Today:  CT head 03/05  ----------------------------------------------------------------------------------------  CHIEF COMPLAINT  Seen at bedside with multiple family members present at this time.  Headache tolerated on acetaminophen.  Residual numbness to left pinky overnight appears to be improving.  Reports no prior h/o CVA.  ----------------------------------------------------------------------------------------  VITALS  T(C): 36.8 (03-05-24 @ 07:34), Max: 36.9 (03-05-24 @ 00:03)  HR: 75 (03-05-24 @ 07:00) (62 - 81)  BP: 94/60 (03-05-24 @ 07:00) (83/59 - 107/60)  RR: 18 (03-05-24 @ 07:00) (11 - 19)  SpO2: 97% (03-05-24 @ 07:00) (94% - 100%)  Wt(kg): --    PAST MEDICAL & SURGICAL HISTORY        LABS  REVIEWED    CBC Full  -  ( 05 Mar 2024 03:10 )  WBC Count : 5.34 K/uL  RBC Count : 3.71 M/uL  Hemoglobin : 11.9 g/dL  Hematocrit : 33.8 %  Platelet Count - Automated : 177 K/uL  Mean Cell Volume : 91.1 fl  Mean Cell Hemoglobin : 32.1 pg  Mean Cell Hemoglobin Concentration : 35.2 gm/dL    139  |  105  |  9.3  ----------------------------<  95  3.9   |  23.0  |  0.82    Ca    8.4      05 Mar 2024 03:10  Phos  3.8     03-05  Mg     2.3     03-05    CT head 03/05: No evidence of acute transcortical infarct, acute intracranial hemorrhage, or mass effect.      ALLERGIES  No Known Allergies    MEDICATIONS   acetaminophen     Tablet .. 975 milliGRAM(s) Oral every 6 hours PRN  benzocaine/menthol Lozenge 1 Lozenge Oral every 3 hours PRN  chlorhexidine 2% Cloths 1 Application(s) Topical <User Schedule>  darunavir 800 mG/cobicstat 150 mG 1 Tablet(s) Oral at bedtime  dextrose 50% Injectable 25 Gram(s) IV Push once  dextrose 50% Injectable 25 Gram(s) IV Push once  dextrose 50% Injectable 12.5 Gram(s) IV Push once  emtricitabine 200 mG/tenofovir alafenamide 25 mG (DESCOVY) Tablet 1 Tablet(s) Oral at bedtime  insulin lispro (ADMELOG) corrective regimen sliding scale   SubCutaneous every 6 hours  lacosamide 150 milliGRAM(s) Oral two times a day  levETIRAcetam   Injectable 1000 milliGRAM(s) IV Push every 12 hours  polyethylene glycol 3350 17 Gram(s) Oral daily  senna 2 Tablet(s) Oral at bedtime  sodium chloride 0.9%. 1000 milliLiter(s) IV Continuous <Continuous>  ----------------------------------------------------------------------------------------  FUNCTIONAL HISTORY  Lives with girlfriend. Flight up to bedroom; however, 1st-floor set-up available.  Independent for all ADLs and ambulation w/o AD.  Works in [employer] during warm months. Active with children and dogs.    CURRENT FUNCTIONAL STATUS  Therapy evals pending.   ----------------------------------------------------------------------------------------  PHYSICAL EXAM  Constitutional - NAD, Comfortable  HEENT - NCAT  Neck - Supple, No limited ROM  Chest - Breathing comfortably on room air  Extremities - No C/C/E, No calf tenderness   Neurologic Exam -                    Cognitive - AAO to self, place, situation     Communication - Fluent, No dysarthria     Cranial Nerves - PERRLA, chronic left visual peripheral field deficit, EOMI, decreased sensation to LT of lower right face, left low face weakness, b/l hearing intact, minimal left tongue deviation, b/l shrug intact     FUNCTIONAL MOTOR EXAM - No focal deficits                    LEFT    UE - ShAB 5/5, EF 5/5, EE 5/5, WE 5/5,  5/5                    RIGHT UE - ShAB 5/5, EF 5/5, EE 5/5, WE 5/5,  5/5                    LEFT    LE - HF 5/5, KE 5/5, DF 5/5, PF 5/5                    RIGHT LE - HF 5/5, KE 5/5, DF 5/5, PF 5/5        Sensory - Intact to LT of extremities     Coordination - Rapid finger movements intact  Psychiatric - Mood stable, Affect WNL  ----------------------------------------------------------------------------------------  ASSESSMENT/PLAN  33yMale w/ PMH HIV and seizure disorder, who presents w/ functional deficits after right MCA CVA s/p TNK + mechanical thrombectomy w/ TICI 3.  Pain - acetaminophen PRN  Seizure disorder - levetiracetam IV, lacosamide  HIV - closest equivalent of Symtuza until home med available  DVT PPX - SCDs (s/p TNK)  Impaired Mobility/Function/Rehab Recommendations - Requires continued acute hospitalization for aforementioned medical issues and work-up. Will benefit from therapy evaluations to determine functional status and potential rehabilitation needs. Continue regular mobilization to prevent further debility. Based on exam today, suspect patient may be candidate to go home.    PM&R will continue to follow along and offer recommendations.

## 2024-03-05 NOTE — PROGRESS NOTE ADULT - ASSESSMENT
33 year old male PMHx of Seizure disorder, previous CVA, HIV was AOX3 in [facility] emergency room awaiting transfer to [facility] for cva management after seizure in parking lot of [facility] witnessed by security ( supported and placed on ground by same-no fall/trauma) patient found on awakening to have left sided paralysis , cat scan showed stroke/2 large vessel occlusions received TNK by [facility], prior to transfer patient with NIH of 15. POD 1 of Mechanical thrombectomy with 1 aspiration pass of distal R M1 occlusion. Patient currently with NIH of 2 for longstanding LHH. Patient otherwise in no acute distress. Groin soft and nontender.      Recommendations:    Vitals and Neurochecks q2h until 24 hr trey of TNK adminstration  Aspirin 81mg qd po at 24 hr trey of TNK admistration  MRI Brain w/o  TTE, RICH, Stroke in young workup  Cardiology consult  Groin checks  SS/PT/OT

## 2024-03-05 NOTE — OCCUPATIONAL THERAPY INITIAL EVALUATION ADULT - MUSCLE TONE ASSESSMENT, REHAB EVAL
bilateral upper extremities/normal [Bone Metastasis] : bone metastasis [Consideration for Non-Curative Therapy] : consideration for non-curative therapy for [Prostate Cancer] : prostate cancer

## 2024-03-05 NOTE — PROGRESS NOTE ADULT - SUBJECTIVE AND OBJECTIVE BOX
NIGHTTIME ROUNDS    Recent events: TTE results noted with mobile LV thrombus.   Patient remains neurologically stable.    Available labs, vitals, imaging reviewed.    Exam:  AO x4, FC, participates in conversation, asks appropriate questions, comprehension seems intact, speech clear, EO spont, face symmetric, tongue midline, EOMI, PERRLA, Left homonymous hemianopsia present (reported as old), motor - TRAORE strongly, with no drift.  Sensation intact to LT.  CTAB  S1S2 present  Abd soft, NT, ND  No peripheral edema. R groin with no signs of hematoma or active bleeding. Pulses present.

## 2024-03-05 NOTE — PROGRESS NOTE ADULT - SUBJECTIVE AND OBJECTIVE BOX
Chief complaint:   Patient is a 33y old  Male who presents with a chief complaint of CVA (05 Mar 2024 08:17)    HPI:  HPI: 33 year old male PMHx of Seizure disorder, previous CVA, HIV was AOX3 in [facility] emergency room awaiting transfer to [facility] for cva management after seizure in parking lot of  [facility] witnessed by security ( supported and placed on ground by same-no fall/trauma) patient found on awakening to have left sided paralysis , cat scan showed stroke/2 large vessel occlusions received TNK by [facility], prior to transfer patient moving left side arm and leg weakly now    PHYSICAL EXAM:  General: Calm  HEENT: MMM  Neuro:  -Mental status- No acute distress  -CN- PERRL 3mm, EOMI, tongue midline, L facial  mild R hemiparesis  L VF deficit from prior stroke    CV: RRR  Pulm: clear to auscultation  Abd: Soft, nontender, nondistended  Ext: no noted edema in lower ext  Skin: warm, dry    ------------------------------------------------------------------------------------------------------  ICU Vital Signs Last 24 Hrs  T(C): 36.7 (05 Mar 2024 12:09), Max: 36.9 (05 Mar 2024 00:03)  T(F): 98.1 (05 Mar 2024 12:09), Max: 98.5 (05 Mar 2024 00:03)  HR: 81 (05 Mar 2024 12:00) (62 - 81)  BP: 122/58 (05 Mar 2024 12:00) (83/59 - 122/58)  BP(mean): 79 (05 Mar 2024 12:00) (65 - 82)  ABP: --  ABP(mean): --  RR: 14 (05 Mar 2024 12:00) (11 - 19)  SpO2: 98% (05 Mar 2024 12:00) (94% - 100%)    O2 Parameters below as of 05 Mar 2024 12:00  Patient On (Oxygen Delivery Method): room air            I&O's Summary    04 Mar 2024 07:01  -  05 Mar 2024 07:00  --------------------------------------------------------  IN: 1470 mL / OUT: 1750 mL / NET: -280 mL    05 Mar 2024 07:01  -  05 Mar 2024 12:48  --------------------------------------------------------  IN: 570 mL / OUT: 850 mL / NET: -280 mL        MEDICATIONS  (STANDING):  chlorhexidine 2% Cloths 1 Application(s) Topical <User Schedule>  darunavir 800 mG/cobicistat 150 mG/emtricitabine 200 mG/tenofovir alafenamide 10 mG (SYMTUZA) 1 Tablet(s) Oral daily  doravirine 100 milliGRAM(s) Oral daily  lacosamide 150 milliGRAM(s) Oral two times a day  levETIRAcetam   Injectable 1500 milliGRAM(s) IV Push at bedtime  pantoprazole    Tablet 40 milliGRAM(s) Oral before breakfast  polyethylene glycol 3350 17 Gram(s) Oral daily  senna 2 Tablet(s) Oral at bedtime      RESPIRATORY:      IMAGING:   Recent imaging studies were reviewed.    LAB RESULTS:                          11.9   5.34  )-----------( 177      ( 05 Mar 2024 03:10 )             33.8           03-05    139  |  105  |  9.3  ----------------------------<  95  3.9   |  23.0  |  0.82    Ca    8.4      05 Mar 2024 03:10  Phos  3.8     03-05  Mg     2.3     03-05

## 2024-03-05 NOTE — PROGRESS NOTE ADULT - ATTENDING COMMENTS
Agree with [place] assessment and plan.  Neurologically back at his baseline with left sided hemianopsia  Echo showed thrombus - appreciate NSICU and stroke neurology input.

## 2024-03-05 NOTE — PROGRESS NOTE ADULT - ASSESSMENT
ASSESSMENT/PLAN:  33M HIV with CVA    Patient is critically ill due to the following diagnoses:  CVA sp TNK    NEURO:  Neuro/Vital checks per TNK/Thrombectomy protocol  CT for 24hr TNK scan  Start ASA/DVT ppx after 24h CTH  MRI wo  Continue home AEDs (Keppra 1/1.5g AM/PM, Vimpat 150mg BID)  TTE  Lipid Panel, start Atorvastatin    PULM: RA  Incentive Spirometry as tolerated    CV:  SBP goal:     RENAL:  Fluids: NS @ 60    GI:  Start Diet  GI prophylaxis [x] not indicated [] PPI [] other:  Bowel regimen [] colace [x] senna [] other: miralax    ENDO:   Goal euglycemia (-180)  ISS    HEME/ONC:  VTE prophylaxis: [x] SCDs [x] hold chemoprophylaxis for TNK    ID:  Cont home ART   hMPV (+), precautions per Infection Control    My full attention was spent providing medically necessary critical care to the patient with details documented in my note above.   Critical care time spent examining patient, reviewing vitals, labs, medications, imaging and discussing with the team goals of care   The combined critical care time provided to the patient was 60 minutes  This time does not include bedside procedures that are documented separately.

## 2024-03-05 NOTE — CONSULT NOTE ADULT - PROBLEM SELECTOR RECOMMENDATION 9
- Acute R MCA occlusion s/p TNK and mechanical thrombectomy.   - Start aspirin and statin when cleared by Neurology.   - Would check a hypercoagulable workup.   - LE venous duplex.   - Echocardiogram pending.   - NPO after midnight.   - Plan for RICH tomorrow.   - If RICH is unrevealing, and hypercoagulable workup is negative, would plan for ILR on day of discharge.   - Continue telemetry monitoring.

## 2024-03-05 NOTE — CONSULT NOTE ADULT - NS ATTEND AMEND GEN_ALL_CORE FT
NEUROVASCULAR SERVICE: ATTENDING ATTESTATION NOTE    I saw and evaluated the patient on 3/5/24.  I discussed the case with the Neurovascular PA/NP and agree with PA's/NP's history, examination findings and treatment plan as documented in the Neurovascular PA's/NP's note with the following additions and changes:    Interval History:  32 y/o M with prior hx of Epilepsy on Vimpat and Keppra, presented with Left sided weakness/neglect, found to have a ICA terminus and RM1and RM2 thrombus, received IV TNK, and transferred here for thrombectomy--TICI 3 for RM1 thrombectomy. Patient states no fam hx of known blood clots or MI or Strokes.  States his vision issues started at same time as he developed seizures--told it was due to a cyst--?neurocysticercosis?  Never told he had a stroke, nor was he on aspirin.      Examination is as documented in the PA/NP's note.  Additional relevant examination findings include Left Homonymous Hemianopia    I reviewed the data in the Neurovascular PA/NP's note including vital signs, radiographic data and procedural data.  I independently reviewed the images of the following studies: CT head, CTA    Assessment: 32 y/o M with prior hx of Epilepsy on Vimpat and Keppra, presented with Left sided weakness/neglect, found to have a ICA terminus and RM1and RM2 thrombus, received IV TNK, and transferred here for thrombectomy--TICI 3 for RM1 thrombectomy.   Current NIHSS 2 (for old visual field deficit)    Stroke mechanism unclear, suspect cardioembolic, as no real evidence of HIV CNS vasculopathy on imaging.        Plan:    - MRI Brain w/wo  - 24 hr post TNK CT scan. If stable, please start ASA and DVT prophylaxis  - Has a hx of gastritis--start PPI  - TTE, then plan for RICH   - Possible ILR  - Hypercoag w/u, LE dopplers  - May consider ID consultation
Patient seen and examined by me.  Patients significant other in the room    T(C): 36.7 (03-05-24 @ 12:09), Max: 36.9 (03-05-24 @ 00:03)  HR: 81 (03-05-24 @ 12:00) (62 - 81)  BP: 122/58 (03-05-24 @ 12:00) (83/59 - 122/58)  RR: 14 (03-05-24 @ 12:00) (11 - 19)  SpO2: 98% (03-05-24 @ 12:00) (94% - 100%)  Patient alert and awake.  Chest- Bilateral Clear BS  Cardiac- S1 and S2  Abdomen- Soft    Assessment/Plan:  1. CVA  2. HIV    I have discussed my recommendation with the PA which are outlined above.  Will follow.

## 2024-03-05 NOTE — PROGRESS NOTE ADULT - SUBJECTIVE AND OBJECTIVE BOX
Neurointerventional Progress Note    SUBJECTIVE:     Patient interviewed and examined at the bedside on the afternoon of 3/5/24. Patient is POD 1 of mechanical thrombectomy of distal R M1 occlusion. Patient in no acute distress, no acute events overnight following extubation.        MEDICATIONS  (STANDING):  chlorhexidine 2% Cloths 1 Application(s) Topical <User Schedule>  darunavir 800 mG/cobicistat 150 mG/emtricitabine 200 mG/tenofovir alafenamide 10 mG (SYMTUZA) 1 Tablet(s) Oral daily  doravirine 100 milliGRAM(s) Oral daily  lacosamide 150 milliGRAM(s) Oral two times a day  levETIRAcetam   Injectable 1500 milliGRAM(s) IV Push at bedtime  pantoprazole    Tablet 40 milliGRAM(s) Oral before breakfast  polyethylene glycol 3350 17 Gram(s) Oral daily  senna 2 Tablet(s) Oral at bedtime    MEDICATIONS  (PRN):  acetaminophen     Tablet .. 975 milliGRAM(s) Oral every 6 hours PRN Temp greater or equal to 38C (100.4F), Mild Pain (1 - 3)  benzocaine/menthol Lozenge 1 Lozenge Oral every 3 hours PRN Sore Throat    ALLERGIES/INTOLERANCES:  Allergies  No Known Allergies    Intolerances    VITALS & EXAMINATION:  Vital Signs Last 24 Hrs  T(C): 36.7 (05 Mar 2024 12:09), Max: 36.9 (05 Mar 2024 00:03)  T(F): 98.1 (05 Mar 2024 12:09), Max: 98.5 (05 Mar 2024 00:03)  HR: 81 (05 Mar 2024 12:00) (62 - 81)  BP: 122/58 (05 Mar 2024 12:00) (83/59 - 122/58)  BP(mean): 79 (05 Mar 2024 12:00) (65 - 82)  RR: 14 (05 Mar 2024 12:00) (11 - 19)  SpO2: 98% (05 Mar 2024 12:00) (94% - 100%)    Parameters below as of 05 Mar 2024 12:00  Patient On (Oxygen Delivery Method): room air        General:  Constitutional:  Male, appears stated age, in no apparent distress including pain  Head: Normocephalic & atraumatic.  ENT: Patent ear canals, intact TM, mucus membranes moist & pink, neck supple, no lymphadenopathy.   Respiratory: Patent airway. All lung fields are clear to auscultation bilaterally.  Extremities: No cyanosis, clubbing, or edema.  Skin: No rashes, bruising, or discoloration.  Groin: soft nontender, no palpable hematoma    Neurological (>12):  MS: Awake, alert, oriented to person, place, situation, time. Normal affect. Follows all commands.    Language: Speech is clear, fluent with good repetition & comprehension    CNs: PERRLA (R = 3mm, L = 3mm). LHH, no nystagmus, no diplopia. V1-3 intact to LT/pinprick, well developed masseter muscles b/l. No facial asymmetry b/l, full eye closure strength b/l. Hearing grossly normal (rubbing fingers) b/l. Symmetric palate elevation in midline. Gag reflex deferred. Head turning & shoulder shrug intact b/l. Tongue midline, normal movements, no atrophy.      Motor: Normal muscle bulk & tone. No noticeable tremor or seizure. No pronator drift.    Sensation: Intact to LT/PP/Temp/Vibration/Position b/l throughout.     Cortical: Extinction on DSS (neglect): none    Coordination: intact rapid-alt movements. No dysmetria to FTN        LABORATORY:  CBC                       11.9   5.34  )-----------( 177      ( 05 Mar 2024 03:10 )             33.8     Chem 03-05    139  |  105  |  9.3  ----------------------------<  95  3.9   |  23.0  |  0.82    Ca    8.4      05 Mar 2024 03:10  Phos  3.8     03-05  Mg     2.3     03-05      LFTs   Coagulopathy   Lipid Panel   A1c   Cardiac enzymes     U/A Urinalysis Basic - ( 05 Mar 2024 03:10 )    Color: x / Appearance: x / SG: x / pH: x  Gluc: 95 mg/dL / Ketone: x  / Bili: x / Urobili: x   Blood: x / Protein: x / Nitrite: x   Leuk Esterase: x / RBC: x / WBC x   Sq Epi: x / Non Sq Epi: x / Bacteria: x      Radiology (XR, CT, MR, U/S, TTE/RICH):     CT Head No Cont (03.05.24 @ 05:48)     IMPRESSION:  No evidence of acute transcortical infarct, acute intracranial   hemorrhage, or mass effect.

## 2024-03-05 NOTE — PHYSICAL THERAPY INITIAL EVALUATION ADULT - PERTINENT HX OF CURRENT PROBLEM, REHAB EVAL
33yM w/ PMH seizure disorder, CVA, and HIV (on Symtuza), who suffered witnessed collapse at [facility] parking lot 03/04/2024. Found to have right ICA and distal M1 occlusion. TNK given at [facility] at 1437 on 3/4 per STEVENSON Trent. Transferred to [facility]. Now s/p mechanical thrombectomy w/ TICI 3 revascularization.

## 2024-03-05 NOTE — CONSULT NOTE ADULT - SUBJECTIVE AND OBJECTIVE BOX
[facility]                                              CARDIOLOGY AT [address], [place] [address]                                             Telephone: [phone]. Fax:[phone]                                                       CARDIOLOGY CONSULTATION NOTE                                                                                             History obtained by: Patient and medical record  Community Cardiologist: None   obtained: Yes [  ] No [ X ]  Reason for Consultation: CVA  Available out pt records reviewed: Yes [  ] No [  ]    Chief complaint:    Patient is a 33y old  Male who presents with a chief complaint of CVA (05 Mar 2024 08:17)      HPI: Patient is a 34 y/o M with a PMHx of HIV, seizure disorder, and previous CVA who presented to [facility] with new onset left sided weakness c/b seizure in the parking lot, CT head showing acute CVA s/p TNK, transferred to [facility] for further management. Patient states that he was in his usual state of health until out shopping yesterday when he suddenly fell onto his left side and noticed he couldn't move his right arm or leg. Patient's girlfriend brought him to [facility] to be evaluated, but he had a witnessed fall with seizure in the parking lot. Patient had a CTA that showed right MCA occlusion s/p TNK and transferred to [facility] for a mechanical thrombectomy. Patient is now in neuro ICU with greatly improving neurological symptoms. Patient states he did not know he had a stroke previously, and it was never worked up. Patient denies any active fevers, chills, CP, SOB, abdominal pain, N/V/D.       PAST MEDICAL HISTORY      PAST SURGICAL HISTORY      SOCIAL HISTORY:  CIGARETTES:   Former smoker, quit a year ago.   ALCOHOL: Former, quit a year ago.   DRUGS: Denies     FAMILY HISTORY:    Family History of Cardiovascular Disease:  Yes [  ] No [ X ]  Coronary Artery Disease in first degree relative: Yes [  ] No [ X ]  Sudden Cardiac Death in First degree relative: Yes [  ] No [ X ]    HOME MEDICATIONS:      CURRENT CARDIAC MEDICATIONS:      CURRENT OTHER MEDICATIONS:  acetaminophen     Tablet .. 975 milliGRAM(s) Oral every 6 hours PRN Temp greater or equal to 38C (100.4F), Mild Pain (1 - 3)  lacosamide 150 milliGRAM(s) Oral two times a day, Stop order after: 30 Days  levETIRAcetam   Injectable 1500 milliGRAM(s) IV Push at bedtime  pantoprazole    Tablet 40 milliGRAM(s) Oral before breakfast  polyethylene glycol 3350 17 Gram(s) Oral daily  senna 2 Tablet(s) Oral at bedtime  benzocaine/menthol Lozenge 1 Lozenge Oral every 3 hours PRN Sore Throat  chlorhexidine 2% Cloths 1 Application(s) Topical <User Schedule>  darunavir 800 mG/cobicistat 150 mG/emtricitabine 200 mG/tenofovir alafenamide 10 mG (SYMTUZA) 1 Tablet(s) Oral daily  doravirine 100 milliGRAM(s) Oral daily      ALLERGIES:   No Known Allergies      REVIEW OF SYMPTOMS:   CONSTITUTIONAL: No fever, no chills, no weight loss, no weight gain, no fatigue   ENMT:  No vertigo; No sinus or throat pain  NECK: No pain or stiffness  CARDIOVASCULAR: No chest pain, no dyspnea, no syncope/presyncope, no palpitations, no dizziness, no Orthopnea, no Paroxsymal nocturnal dyspnea  RESPIRATORY: no Shortness of breath, no cough, no wheezing  : No dysuria, no hematuria   GI: No dark color stool, no nausea, no diarrhea, no constipation, no abdominal pain   NEURO: AS PER HPI  MUSCULOSKELETAL: No joint pain or swelling; No muscle, back, or extremity pain  PSYCH: No agitation, no anxiety.    ALL OTHER REVIEW OF SYSTEMS ARE NEGATIVE.    VITAL SIGNS:  T(C): 36.7 (03-05-24 @ 12:09), Max: 36.9 (03-05-24 @ 00:03)  T(F): 98.1 (03-05-24 @ 12:09), Max: 98.5 (03-05-24 @ 00:03)  HR: 81 (03-05-24 @ 12:00) (62 - 81)  BP: 122/58 (03-05-24 @ 12:00) (83/59 - 122/58)  RR: 14 (03-05-24 @ 12:00) (11 - 19)  SpO2: 98% (03-05-24 @ 12:00) (94% - 100%)    INTAKE AND OUTPUT:     03-04 @ 07:01  -  03-05 @ 07:00  --------------------------------------------------------  IN: 1470 mL / OUT: 1750 mL / NET: -280 mL    03-05 @ 07:01  -  03-05 @ 12:51  --------------------------------------------------------  IN: 570 mL / OUT: 850 mL / NET: -280 mL        PHYSICAL EXAM:  Constitutional: Comfortable . No acute distress.   HEENT: Atraumatic and normocephalic , neck is supple . no JVD. No carotid bruit.  CNS: A&Ox3. No focal deficits.   Respiratory: CTAB, unlabored   Cardiovascular: RRR normal s1 s2. No murmur. No rubs or gallop.  Gastrointestinal: Soft, non-tender. +Bowel sounds.   Extremities: 2+ Peripheral Pulses, No clubbing, cyanosis, or edema  Psychiatric: Calm . no agitation.   Skin: Warm and dry, no ulcers on extremities     LABS:                            11.9   5.34  )-----------( 177      ( 05 Mar 2024 03:10 )             33.8     03-05    139  |  105  |  9.3  ----------------------------<  95  3.9   |  23.0  |  0.82    Ca    8.4      05 Mar 2024 03:10  Phos  3.8     03-05  Mg     2.3     03-05        Urinalysis Basic - ( 05 Mar 2024 03:10 )    Color: x / Appearance: x / SG: x / pH: x  Gluc: 95 mg/dL / Ketone: x  / Bili: x / Urobili: x   Blood: x / Protein: x / Nitrite: x   Leuk Esterase: x / RBC: x / WBC x   Sq Epi: x / Non Sq Epi: x / Bacteria: x          Thyroid Stimulating Hormone, Serum: 4.24 uIU/mL (03-05-24 @ 03:10)      INTERPRETATION OF TELEMETRY: SR, PVCs    ECG: Pending  Prior ECG: Yes [  ] No [  ]    RADIOLOGY & ADDITIONAL STUDIES:    X-ray:    CT scan:   < from: CT Head No Cont (03.05.24 @ 05:48) >  FINDINGS:  There is no CT evidence of acute transcortical infarct. Right occipital   chronic infarct with associated ex vacuo dilatation of the right   occipital horn.    There is no evidence of acute hydrocephalus, mass effect, or acute   intracranial hemorrhage. No extra-axial collection. Basal cisterns are   patent.    The visualized paranasal sinuses and mastoid air cells are clear.    The calvarium is intact.    IMPRESSION:  No evidence of acute transcortical infarct, acute intracranial   hemorrhage, or mass effect.    --- End of Report ---    < end of copied text >    MRI:   US:

## 2024-03-05 NOTE — PHYSICAL THERAPY INITIAL EVALUATION ADULT - GAIT DEVIATIONS NOTED, PT EVAL
verbal cues needed to scan horizon for obstacles while ambulating 2*2 pre-existing left visual field cut

## 2024-03-05 NOTE — PHYSICAL THERAPY INITIAL EVALUATION ADULT - ADDITIONAL COMMENTS
Pt lives in a private home with his wife and 2 children (10y/o and 7 y/o) 0 steps to enter 12 steps inside with handrails (pt can stay on first floor if needed per spouse). Pt was independent PTA without an assist device. Pt owns no DME.

## 2024-03-05 NOTE — CONSULT NOTE ADULT - SUBJECTIVE AND OBJECTIVE BOX
Preliminary note, official note pending attending review/signature.                               [facility] Stroke Team    CC:  HPI: 33 year old male PMHx of Seizure disorder (on Keppra and Vimpat), prior stroke, HIV (on antiretrovirals) was in [employer] parking lot with wife on 3/4/24 when he "started feeling off," patient's wife noticed and drove him to [facility], where in the parking lot patient had a witnessed seizure (patient supported and placed on ground by security); on awakening, patient was found to have Left sided paralysis, NIH 15, CT scan showed acute ischemic stroke with 2 large vessel occlusions, Right ICA Terminus Occlusion w/ Right Distal M1 Occlusion. Patient was given [place]pl[place] [facility] at ~14:37 on 3/4/24 per chart, and was then transferred to [facility] for possible mechanical thrombectomy. Pt had mechanical thrombectomy 3/4/24 in Neuro IR, was found to have a Right Distal M1 Occlusion s/p TICI 3 Recanalization with 1 Aspiration pass. Pt admitted to Neuro ICU for further monitoring, workup, and care. Stroke neurology team called to evaluate.     SUBJECTIVE: Patient sitting up in chair, smiling, in no apparent distress. Patient's wife at bedside. ROS negative other than what is present in HPI or below.    Pre-MRS-0  GCS 15  NIHSS: 14  DATE: 3/4/24  TIME: 18:12  1A: Level of consciousness (0-3): 0  1B: Questions (0-2): 0  1C: Commands (0-2): 0  2: Gaze (0-2): 1  3: Visual fields (0-3): 2  4: Facial palsy (0-3): 2  MOTOR:  5A: Left arm motor drift (0-4): 1  5B: Right arm motor drift (0-4): 1  6A: Left leg motor drift (0-4): 2  6B: Right leg motor drift (0-4): 1  7: Limb ataxia (0-2): 1  SENSORY:  8: Sensation (0-2): 1  SPEECH:  9: Language (0-3): 0  10: Dysarthria (0-2): 1  EXTINCTION:  11: Extinction/inattention (0-2): 1     TOTAL SCORE: 14      LABS:    03-04    139  |  103  |  12.7  ----------------------------<  98  3.6   |  24.0  |  0.94    Ca    8.3<L>      04 Mar 2024 20:05        Urinalysis Basic - ( 04 Mar 2024 20:05 )    Color: x / Appearance: x / SG: x / pH: x  Gluc: 98 mg/dL / Ketone: x  / Bili: x / Urobili: x   Blood: x / Protein: x / Nitrite: x   Leuk Esterase: x / RBC: x / WBC x   Sq Epi: x / Non Sq Epi: x / Bacteria: x            RADIOLOGY & ADDITIONAL TESTS:       (04 Mar 2024 20:47)  The patient is a 33y Male who presented with    Stroke risk factors:    PAST MEDICAL & SURGICAL HISTORY:      MEDICATIONS  (STANDING):  chlorhexidine 2% Cloths 1 Application(s) Topical <User Schedule>  darunavir 800 mG/cobicistat 150 mG/emtricitabine 200 mG/tenofovir alafenamide 10 mG (SYMTUZA) 1 Tablet(s) Oral daily  doravirine 100 milliGRAM(s) Oral daily  lacosamide 150 milliGRAM(s) Oral two times a day  levETIRAcetam   Injectable 1500 milliGRAM(s) IV Push at bedtime  pantoprazole    Tablet 40 milliGRAM(s) Oral before breakfast  polyethylene glycol 3350 17 Gram(s) Oral daily  senna 2 Tablet(s) Oral at bedtime  sodium chloride 0.9% Bolus 500 milliLiter(s) IV Bolus once    MEDICATIONS  (PRN):  acetaminophen     Tablet .. 975 milliGRAM(s) Oral every 6 hours PRN Temp greater or equal to 38C (100.4F), Mild Pain (1 - 3)  benzocaine/menthol Lozenge 1 Lozenge Oral every 3 hours PRN Sore Throat      Allergies    No Known Allergies    Intolerances        SOCIAL HISTORY:  no tob,   no alcohol   no drugs    FAMILY HISTORY:        Vital Signs Last 24 Hrs  T(C): 36.7 (05 Mar 2024 12:09), Max: 36.9 (05 Mar 2024 00:03)  T(F): 98.1 (05 Mar 2024 12:09), Max: 98.5 (05 Mar 2024 00:03)  HR: 81 (05 Mar 2024 12:00) (62 - 81)  BP: 122/58 (05 Mar 2024 12:00) (83/59 - 122/58)  BP(mean): 79 (05 Mar 2024 12:00) (65 - 82)  RR: 14 (05 Mar 2024 12:00) (11 - 19)  SpO2: 98% (05 Mar 2024 12:00) (94% - 100%)    Parameters below as of 05 Mar 2024 12:00  Patient On (Oxygen Delivery Method): room air          General: NAD    Detailed Neurologic Exam:    Mental status: The patient is awake and alert and has normal attention span.  The patient is fully oriented in 3 spheres. The patient is oriented to current events. The patient is able to name objects, follow commands, repeat sentences.    Cranial nerves: Pupils equal and react symmetrically to light. There is no visual field deficit to confrontation. Extraocular motion is full with no nystagmus. There is no ptosis. Facial sensation is intact. Facial musculature is symmetric. Palate elevates symmetrically. Tongue is midline.    Motor: There is normal bulk and tone.  There is no tremor.  Strength is 5/5 in the right arm and leg.   Strength is 5/5 in the left arm and leg.    Sensation: Intact to light touch and pin in 4 extremities    Reflexes: 1-2+ throughout and plantar responses are flexor.    Cerebellar: There is no dysmetria on finger to nose testing.    Gait : deferred    NIH SS:  DATE:  TIME:  1A: Level of consciousness (0-3):   1B: Questions (0-2):   1C: Commands (0-2):   2: Gaze (0-2):   3: Visual fields (0-3):   4: Facial palsy (0-3):   MOTOR:  5A: Left arm motor drift (0-4):   5B: Right arm motor drift (0-4):   6A: Left leg motor drift (0-4):   6B: Right leg motor drift (0-4):   7: Limb ataxia (0-2):   SENSORY:  8: Sensation (0-2):   SPEECH:  9: Language (0-3):   10: Dysarthria (0-2):   EXTINCTION:  11: Extinction/inattention (0-2):     TOTAL SCORE:     prehospital mRS=      LABS:                         11.9   5.34  )-----------( 177      ( 05 Mar 2024 03:10 )             33.8       03-05    139  |  105  |  9.3  ----------------------------<  95  3.9   |  23.0  |  0.82    Ca    8.4      05 Mar 2024 03:10  Phos  3.8     03-05  Mg     2.3     03-05            Lipid panel:    HgbA1c:      RADIOLOGY & ADDITIONAL STUDIES (independently reviewed unless otherwise noted):  CT head:  CTA head: no aneurysm, AVM, LVO or sig stenosis in COW  CTA neck: no sig carotid or vertebral stenosis  CT Perfusion head - CBF<30% volume 0ml, Tmax>6s volume =0ml Preliminary note, official note pending attending review/signature.                               [facility] Stroke Team    CC: stroke  HPI: 33 year old male PMHx of Seizure disorder (on Keppra and Vimpat), prior stroke, HIV (on antiretrovirals) was in [employer] parking lot with wife on 3/4/24 when he "started feeling off," patient's wife noticed and drove him to [facility], where in the parking lot patient had a witnessed seizure (patient supported and placed on ground by security); on awakening, patient was found to have Left sided paralysis, NIH 15, CT scan showed acute ischemic stroke with 2 large vessel occlusions, Right ICA Terminus Occlusion w/ Right Distal M1 Occlusion. Patient was given [place] [facility] [place]17:00 on 3/4/24, and was then transferred to [facility] for possible mechanical thrombectomy. Pt had mechanical thrombectomy 3/4/24 in Neuro IR, was found to have a Right Distal M1 Occlusion s/p TICI 3 Recanalization with 1 Aspiration pass. Pt admitted to Neuro ICU for further monitoring, workup, and care. Stroke neurology team called to evaluate.     SUBJECTIVE: Patient sitting up in chair, smiling, in no apparent distress. Patient's wife at bedside. ROS negative other than what is present in HPI or below.    Pre-MRS: 0  Admission NIH 14    MEDICATIONS  (STANDING):  chlorhexidine 2% Cloths 1 Application(s) Topical <User Schedule>  darunavir 800 mG/cobicistat 150 mG/emtricitabine 200 mG/tenofovir alafenamide 10 mG (SYMTUZA) 1 Tablet(s) Oral daily  doravirine 100 milliGRAM(s) Oral daily  lacosamide 150 milliGRAM(s) Oral two times a day  levETIRAcetam   Injectable 1500 milliGRAM(s) IV Push at bedtime  pantoprazole    Tablet 40 milliGRAM(s) Oral before breakfast  polyethylene glycol 3350 17 Gram(s) Oral daily  senna 2 Tablet(s) Oral at bedtime  sodium chloride 0.9% Bolus 500 milliLiter(s) IV Bolus once    MEDICATIONS  (PRN):  acetaminophen     Tablet .. 975 milliGRAM(s) Oral every 6 hours PRN Temp greater or equal to 38C (100.4F), Mild Pain (1 - 3)  benzocaine/menthol Lozenge 1 Lozenge Oral every 3 hours PRN Sore Throat    Allergies  No Known Allergies    Intolerances  N/A    SOCIAL HISTORY:  no tobacco   no alcohol   no drugs    FAMILY HISTORY:  N/A    Vital Signs Last 24 Hrs  T(C): 36.7 (05 Mar 2024 12:09), Max: 36.9 (05 Mar 2024 00:03)  T(F): 98.1 (05 Mar 2024 12:09), Max: 98.5 (05 Mar 2024 00:03)  HR: 81 (05 Mar 2024 12:00) (62 - 81)  BP: 122/58 (05 Mar 2024 12:00) (83/59 - 122/58)  BP(mean): 79 (05 Mar 2024 12:00) (65 - 82)  RR: 14 (05 Mar 2024 12:00) (11 - 19)  SpO2: 98% (05 Mar 2024 12:00) (94% - 100%)    Parameters below as of 05 Mar 2024 12:00  Patient On (Oxygen Delivery Method): room air      PHYSICAL EXAM:    General: NAD  Detailed Neurologic Exam:    Mental status: The patient is awake and alert and has normal attention span.  The patient is fully oriented in 3 spheres. The patient is oriented to current events. The patient is able to name objects, follow commands, repeat sentences.    Cranial nerves: Pupils equal and react symmetrically to light. There is no visual field deficit to confrontation. Extraocular motion is full with no nystagmus. There is no ptosis. Facial sensation is intact. Facial musculature is symmetric. Palate elevates symmetrically. Tongue is midline.    Motor: There is normal bulk and tone.  There is no tremor.  Strength is 5/5 in the right arm and leg.   Strength is 5/5 in the left arm and leg.    Sensation: Intact to light touch and pin in 4 extremities  Reflexes: 1-2+ throughout and plantar responses are flexor.  Cerebellar: There is no dysmetria on finger to nose testing.  Gait : deferred    NIHSS:   DATE: 3/5/24  TIME: 12:00  1A: Level of consciousness (0-3): 0  1B: Questions (0-2): 0  1C: Commands (0-2): 0  2: Gaze (0-2): 0  3: Visual fields (0-3): 2  4: Facial palsy (0-3): 0  MOTOR:  5A: Left arm motor drift (0-4): 0  5B: Right arm motor drift (0-4): 0  6A: Left leg motor drift (0-4): 0  6B: Right leg motor drift (0-4): 0  7: Limb ataxia (0-2): 0  SENSORY:  8: Sensation (0-2): 0  SPEECH:  9: Language (0-3): 0  10: Dysarthria (0-2): 0  EXTINCTION:  11: Extinction/inattention (0-2): 0     TOTAL SCORE: 2    prehospital mRS=0      LABS:                         11.9   5.34  )-----------( 177      ( 05 Mar 2024 03:10 )             33.8       03-05    139  |  105  |  9.3  ----------------------------<  95  3.9   |  23.0  |  0.82    Ca    8.4      05 Mar 2024 03:10  Phos  3.8     03-05  Mg     2.3     03-05      Lipid panel: Pending  HgbA1c: 4.5      RADIOLOGY & ADDITIONAL STUDIES:    US Duplex Venous Lower Ext Complete, Bilateral (03.05.24 @ 11:16)   IMPRESSION:  No evidence of deep venous thrombosis in either lower extremity.    CT Head No Cont (03.05.24 @ 05:48)  IMPRESSION:  No evidence of acute transcortical infarct, acute intracranial   hemorrhage, or mass effect.    CT Angio/CT Perfusion @ [facility] (03.04.2024 @ 16:09)  IMPRESSION:  CT PERFUSION demonstrated: Technically suboptimal.  If symptoms persist consider follow up head CT or MRI, MRA  if no contraindication.    CT RAPID PERFUSION: Technically suboptimal.  CBF<30% volume: 0 ml  Tmax>6.0 s volume: 0 ml  Mismatch volume: 0 ml  Mismatch ratio: none    CORE INFARCT: None.  TISSUE AT RISK: None.  MISMATCH RATIO: None.    CTA COW:  1 cm focal nonocclusive thrombus versus severe focal stenosis in the proximal M1 segment of right MCA. 1 cm severe focal stenosis distal right M1 segment of right MCA. Suspected tandem LVOs.    CTA NECK: Patent, ECAs, ICAs, no  hemodynamically significant stenosis at  ICA origins by NASCET criteria.  Bilateral vertebral arteries are patent without flow limiting stenosis.    CT Head @ [facility] (03.04.2024 @ 15:55)  IMPRESSION:  HEAD CT: No acute hemorrhage or midline shift.  Chronic infarct right PCA territory. Preliminary note, official note pending attending review/signature.                               [facility] Stroke Team    CC: stroke  HPI: 33 year old male PMHx of Seizure disorder (on Keppra and Vimpat), prior stroke, HIV (on antiretrovirals) was in [employer] parking lot with wife on 3/4/24 when he "started feeling off," patient's wife noticed and drove him to [facility], where in the parking lot patient had a witnessed seizure (patient supported and placed on ground by security); on awakening, patient was found to have Left sided paralysis, NIH 15, CT scan showed acute ischemic stroke with 2 large vessel occlusions, Right ICA Terminus Occlusion w/ Right Distal M1 Occlusion. Patient was given tenect[place]plase by [facility] at ~17:00 on 3/4/24, and was then transferred to [facility] for possible mechanical thrombectomy. Pt had mechanical thrombectomy 3/4/24 in Neuro IR, was found to have a Right Distal M1 Occlusion s/p TICI 3 Recanalization with 1 Aspiration pass. Pt admitted to Neuro ICU for further monitoring, workup, and care. Stroke neurology team called to evaluate.     SUBJECTIVE: Patient sitting up in chair, smiling, in no apparent distress. Patient's wife at bedside. ROS negative other than what is present in HPI or below.    Pre-MRS: 0  Admission NIH 14    MEDICATIONS  (STANDING):  chlorhexidine 2% Cloths 1 Application(s) Topical <User Schedule>  darunavir 800 mG/cobicistat 150 mG/emtricitabine 200 mG/tenofovir alafenamide 10 mG (SYMTUZA) 1 Tablet(s) Oral daily  doravirine 100 milliGRAM(s) Oral daily  lacosamide 150 milliGRAM(s) Oral two times a day  levETIRAcetam   Injectable 1500 milliGRAM(s) IV Push at bedtime  pantoprazole    Tablet 40 milliGRAM(s) Oral before breakfast  polyethylene glycol 3350 17 Gram(s) Oral daily  senna 2 Tablet(s) Oral at bedtime  sodium chloride 0.9% Bolus 500 milliLiter(s) IV Bolus once    MEDICATIONS  (PRN):  acetaminophen     Tablet .. 975 milliGRAM(s) Oral every 6 hours PRN Temp greater or equal to 38C (100.4F), Mild Pain (1 - 3)  benzocaine/menthol Lozenge 1 Lozenge Oral every 3 hours PRN Sore Throat    Allergies  No Known Allergies    Intolerances  N/A    SOCIAL HISTORY:  no tobacco   no alcohol   no drugs    FAMILY HISTORY:  N/A    Vital Signs Last 24 Hrs  T(C): 36.7 (05 Mar 2024 12:09), Max: 36.9 (05 Mar 2024 00:03)  T(F): 98.1 (05 Mar 2024 12:09), Max: 98.5 (05 Mar 2024 00:03)  HR: 81 (05 Mar 2024 12:00) (62 - 81)  BP: 122/58 (05 Mar 2024 12:00) (83/59 - 122/58)  BP(mean): 79 (05 Mar 2024 12:00) (65 - 82)  RR: 14 (05 Mar 2024 12:00) (11 - 19)  SpO2: 98% (05 Mar 2024 12:00) (94% - 100%)    Parameters below as of 05 Mar 2024 12:00  Patient On (Oxygen Delivery Method): room air      PHYSICAL EXAM:    General: NAD  Detailed Neurologic Exam:    Mental status: The patient is awake and alert and has normal attention span.  The patient is fully oriented in 3 spheres. The patient is oriented to current events. The patient is able to name objects, follow commands, repeat sentences.    Cranial nerves: Pupils equal and react symmetrically to light. Left homonymous hemianopsia. Extraocular motion is full with no nystagmus. There is no ptosis. Facial sensation is intact. Facial musculature is symmetric. Palate elevates symmetrically. Tongue is midline.    Motor: There is normal bulk and tone.  There is no tremor.  Strength is 5/5 in the right arm and leg.   Strength is 5/5 in the left arm and leg.    Sensation: Intact to light touch and pin in 4 extremities  Reflexes: 1-2+ throughout and plantar responses are flexor.  Cerebellar: There is no dysmetria on finger to nose testing.  Gait : deferred    NIHSS:   DATE: 3/5/24  TIME: 12:00  1A: Level of consciousness (0-3): 0  1B: Questions (0-2): 0  1C: Commands (0-2): 0  2: Gaze (0-2): 0  3: Visual fields (0-3): 2  4: Facial palsy (0-3): 0  MOTOR:  5A: Left arm motor drift (0-4): 0  5B: Right arm motor drift (0-4): 0  6A: Left leg motor drift (0-4): 0  6B: Right leg motor drift (0-4): 0  7: Limb ataxia (0-2): 0  SENSORY:  8: Sensation (0-2): 0  SPEECH:  9: Language (0-3): 0  10: Dysarthria (0-2): 0  EXTINCTION:  11: Extinction/inattention (0-2): 0     TOTAL SCORE: 2    prehospital mRS=0      LABS:                         11.9   5.34  )-----------( 177      ( 05 Mar 2024 03:10 )             33.8       03-05    139  |  105  |  9.3  ----------------------------<  95  3.9   |  23.0  |  0.82    Ca    8.4      05 Mar 2024 03:10  Phos  3.8     03-05  Mg     2.3     03-05      Lipid panel: Pending  HgbA1c: 4.5      RADIOLOGY & ADDITIONAL STUDIES:    US Duplex Venous Lower Ext Complete, Bilateral (03.05.24 @ 11:16)   IMPRESSION:  No evidence of deep venous thrombosis in either lower extremity.    CT Head No Cont (03.05.24 @ 05:48)  IMPRESSION:  No evidence of acute transcortical infarct, acute intracranial   hemorrhage, or mass effect.    CT Angio/CT Perfusion @ [facility] (03.04.2024 @ 16:09)  IMPRESSION:  CT PERFUSION demonstrated: Technically suboptimal.  If symptoms persist consider follow up head CT or MRI, MRA  if no contraindication.    CT RAPID PERFUSION: Technically suboptimal.  CBF<30% volume: 0 ml  Tmax>6.0 s volume: 0 ml  Mismatch volume: 0 ml  Mismatch ratio: none    CORE INFARCT: None.  TISSUE AT RISK: None.  MISMATCH RATIO: None.    CTA COW:  1 cm focal nonocclusive thrombus versus severe focal stenosis in the proximal M1 segment of right MCA. 1 cm severe focal stenosis distal right M1 segment of right MCA. Suspected tandem LVOs.    CTA NECK: Patent, ECAs, ICAs, no  hemodynamically significant stenosis at  ICA origins by NASCET criteria.  Bilateral vertebral arteries are patent without flow limiting stenosis.    CT Head @ [facility] (03.04.2024 @ 15:55)  IMPRESSION:  HEAD CT: No acute hemorrhage or midline shift.  Chronic infarct right PCA territory.                                [facility] Stroke Team    CC: stroke  HPI: 33 year old male PMHx of Seizure disorder (on Keppra and Vimpat), prior stroke, HIV (on antiretrovirals) was in [employer] parking lot with wife on 3/4/24 when he "started feeling off," patient's wife noticed and drove him to [facility], where in the parking lot patient had a witnessed seizure (patient supported and placed on ground by security); on awakening, patient was found to have Left sided paralysis, NIH 15, CT scan showed acute ischemic stroke with 2 large vessel occlusions, Right ICA Terminus Occlusion w/ Right Distal M1 Occlusion. Patient was given tenecteplase by [facility] at ~17:00 on 3/4/24, and was then transferred to [facility] for possible mechanical thrombectomy. Pt had mechanical thrombectomy 3/4/24 in Neuro IR, was found to have a Right Distal M1 Occlusion s/p TICI 3 Recanalization with 1 Aspiration pass. Pt admitted to Neuro ICU for further monitoring, workup, and care. Stroke neurology team called to evaluate.     SUBJECTIVE: Patient sitting up in chair, smiling, in no apparent distress. Patient's wife at bedside. ROS negative other than what is present in HPI or below.    Pre-MRS: 0  Admission NIH 14    MEDICATIONS  (STANDING):  chlorhexidine 2% Cloths 1 Application(s) Topical <User Schedule>  darunavir 800 mG/cobicistat 150 mG/emtricitabine 200 mG/tenofovir alafenamide 10 mG (SYMTUZA) 1 Tablet(s) Oral daily  doravirine 100 milliGRAM(s) Oral daily  lacosamide 150 milliGRAM(s) Oral two times a day  levETIRAcetam   Injectable 1500 milliGRAM(s) IV Push at bedtime  pantoprazole    Tablet 40 milliGRAM(s) Oral before breakfast  polyethylene glycol 3350 17 Gram(s) Oral daily  senna 2 Tablet(s) Oral at bedtime  sodium chloride 0.9% Bolus 500 milliLiter(s) IV Bolus once    MEDICATIONS  (PRN):  acetaminophen     Tablet .. 975 milliGRAM(s) Oral every 6 hours PRN Temp greater or equal to 38C (100.4F), Mild Pain (1 - 3)  benzocaine/menthol Lozenge 1 Lozenge Oral every 3 hours PRN Sore Throat    Allergies  No Known Allergies    Intolerances  N/A    SOCIAL HISTORY:  no tobacco   no alcohol   no drugs    FAMILY HISTORY:  N/A    Vital Signs Last 24 Hrs  T(C): 36.7 (05 Mar 2024 12:09), Max: 36.9 (05 Mar 2024 00:03)  T(F): 98.1 (05 Mar 2024 12:09), Max: 98.5 (05 Mar 2024 00:03)  HR: 81 (05 Mar 2024 12:00) (62 - 81)  BP: 122/58 (05 Mar 2024 12:00) (83/59 - 122/58)  BP(mean): 79 (05 Mar 2024 12:00) (65 - 82)  RR: 14 (05 Mar 2024 12:00) (11 - 19)  SpO2: 98% (05 Mar 2024 12:00) (94% - 100%)    Parameters below as of 05 Mar 2024 12:00  Patient On (Oxygen Delivery Method): room air      PHYSICAL EXAM:    General: NAD  Detailed Neurologic Exam:    Mental status: The patient is awake and alert and has normal attention span.  The patient is fully oriented in 3 spheres. The patient is oriented to current events. The patient is able to name objects, follow commands, repeat sentences.    Cranial nerves: Pupils equal and react symmetrically to light. Left homonymous hemianopsia. Extraocular motion is full with no nystagmus. There is no ptosis. Facial sensation is intact. Facial musculature is symmetric. Palate elevates symmetrically. Tongue is midline.    Motor: There is normal bulk and tone.  There is no tremor.  Strength is 5/5 in the right arm and leg.   Strength is 5/5 in the left arm and leg.    Sensation: Intact to light touch and pin in 4 extremities  Reflexes: 1-2+ throughout and plantar responses are flexor.  Cerebellar: There is no dysmetria on finger to nose testing.  Gait : deferred    NIHSS:   DATE: 3/5/24  TIME: 12:00  1A: Level of consciousness (0-3): 0  1B: Questions (0-2): 0  1C: Commands (0-2): 0  2: Gaze (0-2): 0  3: Visual fields (0-3): 2  4: Facial palsy (0-3): 0  MOTOR:  5A: Left arm motor drift (0-4): 0  5B: Right arm motor drift (0-4): 0  6A: Left leg motor drift (0-4): 0  6B: Right leg motor drift (0-4): 0  7: Limb ataxia (0-2): 0  SENSORY:  8: Sensation (0-2): 0  SPEECH:  9: Language (0-3): 0  10: Dysarthria (0-2): 0  EXTINCTION:  11: Extinction/inattention (0-2): 0     TOTAL SCORE: 2    prehospital mRS=0      LABS:                         11.9   5.34  )-----------( 177      ( 05 Mar 2024 03:10 )             33.8       03-05    139  |  105  |  9.3  ----------------------------<  95  3.9   |  23.0  |  0.82    Ca    8.4      05 Mar 2024 03:10  Phos  3.8     03-05  Mg     2.3     03-05      Lipid panel: Pending  HgbA1c: 4.5      RADIOLOGY & ADDITIONAL STUDIES:    US Duplex Venous Lower Ext Complete, Bilateral (03.05.24 @ 11:16)   IMPRESSION:  No evidence of deep venous thrombosis in either lower extremity.    CT Head No Cont (03.05.24 @ 05:48)  IMPRESSION:  No evidence of acute transcortical infarct, acute intracranial   hemorrhage, or mass effect.    CT Angio/CT Perfusion @ [facility] (03.04.2024 @ 16:09)  IMPRESSION:  CT PERFUSION demonstrated: Technically suboptimal.  If symptoms persist consider follow up head CT or MRI, MRA  if no contraindication.    CT RAPID PERFUSION: Technically suboptimal.  CBF<30% volume: 0 ml  Tmax>6.0 s volume: 0 ml  Mismatch volume: 0 ml  Mismatch ratio: none    CORE INFARCT: None.  TISSUE AT RISK: None.  MISMATCH RATIO: None.    CTA COW:  1 cm focal nonocclusive thrombus versus severe focal stenosis in the proximal M1 segment of right MCA. 1 cm severe focal stenosis distal right M1 segment of right MCA. Suspected tandem LVOs.    CTA NECK: Patent, ECAs, ICAs, no  hemodynamically significant stenosis at  ICA origins by NASCET criteria.  Bilateral vertebral arteries are patent without flow limiting stenosis.    CT Head @ [facility] (03.04.2024 @ 15:55)  IMPRESSION:  HEAD CT: No acute hemorrhage or midline shift.  Chronic infarct right PCA territory.

## 2024-03-05 NOTE — CONSULT NOTE ADULT - ASSESSMENT
A/P: Patient is a 32 y/o M with a PMHx of HIV, seizure disorder, and previous CVA who presented to [facility] with new onset left sided weakness c/b seizure in the parking lot, CT head showing acute CVA s/p TNK, transferred to [facility] for further management. Patient states that he was in his usual state of health until out shopping yesterday when he suddenly fell onto his left side and noticed he couldn't move his right arm or leg. Patient's girlfriend brought him to [facility] to be evaluated, but he had a witnessed fall with seizure in the parking lot. Patient had a CTA that showed right MCA occlusion s/p TNK and transferred to [facility] for a mechanical thrombectomy. Patient is now in neuro ICU with greatly improving neurological symptoms. Patient states he did not know he had a stroke previously, and it was never worked up. Patient denies any active fevers, chills, CP, SOB, abdominal pain, N/V/D.     
ASSESSMENT:  33 year old male PMHx of Seizure disorder (on Keppra and Vimpat), prior stroke, HIV (on antiretrovirals) was in [employer] parking lot with wife on 3/4/24 when he "started feeling off," patient's wife noticed and drove him to [facility], where in the parking lot patient had a witnessed seizure (patient supported and placed on ground by security); on awakening, patient was found to have Left sided paralysis, NIH 15, CT scan showed acute ischemic stroke with 2 large vessel occlusions, Right ICA Terminus Occlusion w/ Right Distal M1 Occlusion. Patient was given tenecteplase by [facility] at ~17:00 on 3/4/24, and was then transferred to [facility] for possible mechanical thrombectomy. Pt had mechanical thrombectomy 3/4/24 in Neuro IR, was found to have a Right Distal M1 Occlusion s/p TICI 3 Recanalization with 1 Aspiration pass.     Patient on antiretroviral therapy for HIV, endorses history of seizure disorder but denied any knowledge of prior stroke. Patient states he had his first seizure at age 26 and had workup done at a hospital in [place] including lumbar puncture. 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 Ron Chan, is on Keppra and Vimpat outpatient for management of seizures.     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 Pending further workup.     NEURO:   -Neurologically patient only with BASELINE Left homonymous hemianopsia from prior to this admission/event  -Continue close monitoring for neurologic deterioration    -Stroke neuro checks q 1 hour, after 24-hour post tenecteplase stability CTH, can be q 2 hours when/if appropriate per Neuro ICU  -Permissive HTN, avoiding rapid fluctuations and hypotension   -ANTITHROMBOTIC THERAPY: Recommend initiating ASA 81mg PO q daily AFTER stability scan  -Pending Lipid Panel -- Titrate statin to LDL goal less than 70  -Obtain MRI Brain w/o  -Dysphagia screen: PASS  -Physical therapy/OT/Speech eval/treatment.   -Pending TTE  -Pending RICH tomorrow 3/5/24 per cardiology note  -Cardiac monitoring w/ telemetry for now, further evaluation pending findings of noted workup  , +/- ILR             -Patient should have all age and risk appropriate malignancy screenings with PCP or sooner if clinically suspected   -DVT ppx: Heparin s.c [] LMWH [] SCD[x]    -Maintain adequate hydration    -Monitor for si/sx of infection   -A1C 4.5   -Stroke education     OTHER: Condition and plan of care d/w patient and wife at bedside, questions and concerns addressed.     DISPOSITION: Rehab or home depending on PT eval once stable and workup is complete    CORE MEASURES:        Admission NIHSS: 14     Tenecteplase : [x] YES [] NO      LDL/A1C: Pending/4.5     Depression Screen- if depression hx and/or present      Statin Therapy: Pending lipid panel     Dysphagia Screen: [x] PASS [] FAIL     Smoking [] YES [x] NO      Afib [] YES [x] NO     Stroke Education [x] YES [] NO

## 2024-03-05 NOTE — OCCUPATIONAL THERAPY INITIAL EVALUATION ADULT - LIVES WITH, PROFILE
wife and two children, 11 &6, in a private house with no steps to enter and flight inside with railing/children/spouse

## 2024-03-06 DIAGNOSIS — I24.0 ACUTE CORONARY THROMBOSIS NOT RESULTING IN MYOCARDIAL INFARCTION: ICD-10-CM

## 2024-03-06 DIAGNOSIS — I51.3 INTRACARDIAC THROMBOSIS, NOT ELSEWHERE CLASSIFIED: ICD-10-CM

## 2024-03-06 LAB
ALBUMIN SERPL ELPH-MCNC: 4 G/DL — SIGNIFICANT CHANGE UP (ref 3.3–5.2)
ALP SERPL-CCNC: 72 U/L — SIGNIFICANT CHANGE UP (ref 40–120)
ALT FLD-CCNC: 10 U/L — SIGNIFICANT CHANGE UP
ANION GAP SERPL CALC-SCNC: 14 MMOL/L — SIGNIFICANT CHANGE UP (ref 5–17)
APTT BLD: 37.2 SEC — HIGH (ref 24.5–35.6)
APTT BLD: 50.1 SEC — HIGH (ref 24.5–35.6)
APTT BLD: 64.4 SEC — HIGH (ref 24.5–35.6)
APTT BLD: 84.8 SEC — HIGH (ref 24.5–35.6)
AST SERPL-CCNC: 17 U/L — SIGNIFICANT CHANGE UP
AT III ACT/NOR PPP CHRO: 108 % — SIGNIFICANT CHANGE UP (ref 85–135)
BASOPHILS # BLD AUTO: 0.02 K/UL — SIGNIFICANT CHANGE UP (ref 0–0.2)
BASOPHILS NFR BLD AUTO: 0.4 % — SIGNIFICANT CHANGE UP (ref 0–2)
BILIRUB DIRECT SERPL-MCNC: 0.1 MG/DL — SIGNIFICANT CHANGE UP (ref 0–0.3)
BILIRUB INDIRECT FLD-MCNC: 0.2 MG/DL — SIGNIFICANT CHANGE UP (ref 0.2–1)
BILIRUB SERPL-MCNC: 0.3 MG/DL — LOW (ref 0.4–2)
BUN SERPL-MCNC: 8.6 MG/DL — SIGNIFICANT CHANGE UP (ref 8–20)
CALCIUM SERPL-MCNC: 8.8 MG/DL — SIGNIFICANT CHANGE UP (ref 8.4–10.5)
CHLORIDE SERPL-SCNC: 102 MMOL/L — SIGNIFICANT CHANGE UP (ref 96–108)
CHOLEST SERPL-MCNC: 127 MG/DL — SIGNIFICANT CHANGE UP
CK SERPL-CCNC: 134 U/L — SIGNIFICANT CHANGE UP (ref 30–200)
CO2 SERPL-SCNC: 23 MMOL/L — SIGNIFICANT CHANGE UP (ref 22–29)
CREAT SERPL-MCNC: 0.82 MG/DL — SIGNIFICANT CHANGE UP (ref 0.5–1.3)
CRP SERPL-MCNC: 26 MG/L — HIGH
DRVVT RATIO: 1.07 RATIO — SIGNIFICANT CHANGE UP (ref 0–1.21)
DRVVT SCREEN TO CONFIRM RATIO: SIGNIFICANT CHANGE UP
DSDNA AB SER-ACNC: <12 IU/ML — SIGNIFICANT CHANGE UP
EGFR: 120 ML/MIN/1.73M2 — SIGNIFICANT CHANGE UP
EOSINOPHIL # BLD AUTO: 0.03 K/UL — SIGNIFICANT CHANGE UP (ref 0–0.5)
EOSINOPHIL NFR BLD AUTO: 0.6 % — SIGNIFICANT CHANGE UP (ref 0–6)
ERYTHROCYTE [SEDIMENTATION RATE] IN BLOOD: 19 MM/HR — HIGH (ref 0–15)
FERRITIN SERPL-MCNC: 114 NG/ML — SIGNIFICANT CHANGE UP (ref 30–400)
GLUCOSE SERPL-MCNC: 91 MG/DL — SIGNIFICANT CHANGE UP (ref 70–99)
HCT VFR BLD CALC: 35.7 % — LOW (ref 39–50)
HCT VFR BLD CALC: 36.3 % — LOW (ref 39–50)
HCT VFR BLD CALC: 38.4 % — LOW (ref 39–50)
HCT VFR BLD CALC: 39.9 % — SIGNIFICANT CHANGE UP (ref 39–50)
HCYS SERPL-MCNC: 8.9 UMOL/L — SIGNIFICANT CHANGE UP
HDLC SERPL-MCNC: 45 MG/DL — SIGNIFICANT CHANGE UP
HGB BLD-MCNC: 12.7 G/DL — LOW (ref 13–17)
HGB BLD-MCNC: 13 G/DL — SIGNIFICANT CHANGE UP (ref 13–17)
HGB BLD-MCNC: 13.2 G/DL — SIGNIFICANT CHANGE UP (ref 13–17)
HGB BLD-MCNC: 13.6 G/DL — SIGNIFICANT CHANGE UP (ref 13–17)
IMM GRANULOCYTES NFR BLD AUTO: 0.2 % — SIGNIFICANT CHANGE UP (ref 0–0.9)
INR BLD: 1.07 RATIO — SIGNIFICANT CHANGE UP (ref 0.85–1.18)
LIPID PNL WITH DIRECT LDL SERPL: 70 MG/DL — SIGNIFICANT CHANGE UP
LYMPHOCYTES # BLD AUTO: 2.2 K/UL — SIGNIFICANT CHANGE UP (ref 1–3.3)
LYMPHOCYTES # BLD AUTO: 45.2 % — HIGH (ref 13–44)
MAGNESIUM SERPL-MCNC: 2 MG/DL — SIGNIFICANT CHANGE UP (ref 1.6–2.6)
MCHC RBC-ENTMCNC: 31.5 PG — SIGNIFICANT CHANGE UP (ref 27–34)
MCHC RBC-ENTMCNC: 31.7 PG — SIGNIFICANT CHANGE UP (ref 27–34)
MCHC RBC-ENTMCNC: 32.1 PG — SIGNIFICANT CHANGE UP (ref 27–34)
MCHC RBC-ENTMCNC: 32.3 PG — SIGNIFICANT CHANGE UP (ref 27–34)
MCHC RBC-ENTMCNC: 34.1 GM/DL — SIGNIFICANT CHANGE UP (ref 32–36)
MCHC RBC-ENTMCNC: 34.4 GM/DL — SIGNIFICANT CHANGE UP (ref 32–36)
MCHC RBC-ENTMCNC: 35.6 GM/DL — SIGNIFICANT CHANGE UP (ref 32–36)
MCHC RBC-ENTMCNC: 35.8 GM/DL — SIGNIFICANT CHANGE UP (ref 32–36)
MCV RBC AUTO: 90.2 FL — SIGNIFICANT CHANGE UP (ref 80–100)
MCV RBC AUTO: 90.3 FL — SIGNIFICANT CHANGE UP (ref 80–100)
MCV RBC AUTO: 91.6 FL — SIGNIFICANT CHANGE UP (ref 80–100)
MCV RBC AUTO: 93 FL — SIGNIFICANT CHANGE UP (ref 80–100)
MONOCYTES # BLD AUTO: 0.38 K/UL — SIGNIFICANT CHANGE UP (ref 0–0.9)
MONOCYTES NFR BLD AUTO: 7.8 % — SIGNIFICANT CHANGE UP (ref 2–14)
NEUTROPHILS # BLD AUTO: 2.23 K/UL — SIGNIFICANT CHANGE UP (ref 1.8–7.4)
NEUTROPHILS NFR BLD AUTO: 45.8 % — SIGNIFICANT CHANGE UP (ref 43–77)
NON HDL CHOLESTEROL: 82 MG/DL — SIGNIFICANT CHANGE UP
NT-PROBNP SERPL-SCNC: 177 PG/ML — SIGNIFICANT CHANGE UP (ref 0–300)
PHOSPHATE SERPL-MCNC: 3.7 MG/DL — SIGNIFICANT CHANGE UP (ref 2.4–4.7)
PLATELET # BLD AUTO: 186 K/UL — SIGNIFICANT CHANGE UP (ref 150–400)
PLATELET # BLD AUTO: 189 K/UL — SIGNIFICANT CHANGE UP (ref 150–400)
PLATELET # BLD AUTO: 195 K/UL — SIGNIFICANT CHANGE UP (ref 150–400)
PLATELET # BLD AUTO: 227 K/UL — SIGNIFICANT CHANGE UP (ref 150–400)
POTASSIUM SERPL-MCNC: 4.1 MMOL/L — SIGNIFICANT CHANGE UP (ref 3.5–5.3)
POTASSIUM SERPL-SCNC: 4.1 MMOL/L — SIGNIFICANT CHANGE UP (ref 3.5–5.3)
PROT C ACT/NOR PPP: 92 % — SIGNIFICANT CHANGE UP (ref 74–150)
PROT S FREE AG PPP IA-ACNC: 80 % — SIGNIFICANT CHANGE UP (ref 67–141)
PROT SERPL-MCNC: 7.1 G/DL — SIGNIFICANT CHANGE UP (ref 6.6–8.7)
PROTHROM AB SERPL-ACNC: 11.8 SEC — SIGNIFICANT CHANGE UP (ref 9.5–13)
RBC # BLD: 3.96 M/UL — LOW (ref 4.2–5.8)
RBC # BLD: 4.02 M/UL — LOW (ref 4.2–5.8)
RBC # BLD: 4.19 M/UL — LOW (ref 4.2–5.8)
RBC # BLD: 4.29 M/UL — SIGNIFICANT CHANGE UP (ref 4.2–5.8)
RBC # FLD: 11.9 % — SIGNIFICANT CHANGE UP (ref 10.3–14.5)
RHEUMATOID FACT SERPL-ACNC: <10 IU/ML — SIGNIFICANT CHANGE UP (ref 0–13)
SODIUM SERPL-SCNC: 139 MMOL/L — SIGNIFICANT CHANGE UP (ref 135–145)
TRIGL SERPL-MCNC: 62 MG/DL — SIGNIFICANT CHANGE UP
TROPONIN T, HIGH SENSITIVITY RESULT: 11 NG/L — SIGNIFICANT CHANGE UP (ref 0–51)
UFH PPP CHRO-ACNC: 0.33 IU/ML — SIGNIFICANT CHANGE UP (ref 0.3–0.7)
UFH PPP CHRO-ACNC: 0.47 IU/ML — SIGNIFICANT CHANGE UP (ref 0.3–0.7)
WBC # BLD: 3.81 K/UL — SIGNIFICANT CHANGE UP (ref 3.8–10.5)
WBC # BLD: 4.29 K/UL — SIGNIFICANT CHANGE UP (ref 3.8–10.5)
WBC # BLD: 4.86 K/UL — SIGNIFICANT CHANGE UP (ref 3.8–10.5)
WBC # BLD: 4.87 K/UL — SIGNIFICANT CHANGE UP (ref 3.8–10.5)
WBC # FLD AUTO: 3.81 K/UL — SIGNIFICANT CHANGE UP (ref 3.8–10.5)
WBC # FLD AUTO: 4.29 K/UL — SIGNIFICANT CHANGE UP (ref 3.8–10.5)
WBC # FLD AUTO: 4.86 K/UL — SIGNIFICANT CHANGE UP (ref 3.8–10.5)
WBC # FLD AUTO: 4.87 K/UL — SIGNIFICANT CHANGE UP (ref 3.8–10.5)

## 2024-03-06 PROCEDURE — 99233 SBSQ HOSP IP/OBS HIGH 50: CPT

## 2024-03-06 PROCEDURE — 76870 US EXAM SCROTUM: CPT | Mod: 26

## 2024-03-06 PROCEDURE — 70450 CT HEAD/BRAIN W/O DYE: CPT | Mod: 26

## 2024-03-06 PROCEDURE — 99232 SBSQ HOSP IP/OBS MODERATE 35: CPT | Mod: 25

## 2024-03-06 PROCEDURE — 93010 ELECTROCARDIOGRAM REPORT: CPT

## 2024-03-06 RX ORDER — HEPARIN SODIUM 5000 [USP'U]/ML
900 INJECTION INTRAVENOUS; SUBCUTANEOUS
Qty: 25000 | Refills: 0 | Status: DISCONTINUED | OUTPATIENT
Start: 2024-03-06 | End: 2024-03-08

## 2024-03-06 RX ORDER — SODIUM CHLORIDE 9 MG/ML
1000 INJECTION INTRAMUSCULAR; INTRAVENOUS; SUBCUTANEOUS ONCE
Refills: 0 | Status: COMPLETED | OUTPATIENT
Start: 2024-03-06 | End: 2024-03-06

## 2024-03-06 RX ORDER — HEPARIN SODIUM 5000 [USP'U]/ML
900 INJECTION INTRAVENOUS; SUBCUTANEOUS
Qty: 25000 | Refills: 0 | Status: DISCONTINUED | OUTPATIENT
Start: 2024-03-06 | End: 2024-03-06

## 2024-03-06 RX ORDER — METOPROLOL TARTRATE 50 MG
25 TABLET ORAL ONCE
Refills: 0 | Status: COMPLETED | OUTPATIENT
Start: 2024-03-06 | End: 2024-03-06

## 2024-03-06 RX ORDER — SODIUM CHLORIDE 9 MG/ML
500 INJECTION, SOLUTION INTRAVENOUS ONCE
Refills: 0 | Status: COMPLETED | OUTPATIENT
Start: 2024-03-06 | End: 2024-03-07

## 2024-03-06 RX ORDER — SODIUM CHLORIDE 9 MG/ML
500 INJECTION INTRAMUSCULAR; INTRAVENOUS; SUBCUTANEOUS ONCE
Refills: 0 | Status: COMPLETED | OUTPATIENT
Start: 2024-03-06 | End: 2024-03-06

## 2024-03-06 RX ORDER — LEVETIRACETAM 250 MG/1
1500 TABLET, FILM COATED ORAL AT BEDTIME
Refills: 0 | Status: DISCONTINUED | OUTPATIENT
Start: 2024-03-06 | End: 2024-03-08

## 2024-03-06 RX ORDER — HEPARIN SODIUM 5000 [USP'U]/ML
1100 INJECTION INTRAVENOUS; SUBCUTANEOUS
Qty: 25000 | Refills: 0 | Status: DISCONTINUED | OUTPATIENT
Start: 2024-03-06 | End: 2024-03-06

## 2024-03-06 RX ORDER — LEVETIRACETAM 250 MG/1
1000 TABLET, FILM COATED ORAL DAILY
Refills: 0 | Status: DISCONTINUED | OUTPATIENT
Start: 2024-03-06 | End: 2024-03-08

## 2024-03-06 RX ORDER — ACETAMINOPHEN 500 MG
1000 TABLET ORAL ONCE
Refills: 0 | Status: DISCONTINUED | OUTPATIENT
Start: 2024-03-06 | End: 2024-03-08

## 2024-03-06 RX ADMIN — LACOSAMIDE 150 MILLIGRAM(S): 50 TABLET ORAL at 17:32

## 2024-03-06 RX ADMIN — LEVETIRACETAM 1500 MILLIGRAM(S): 250 TABLET, FILM COATED ORAL at 21:23

## 2024-03-06 RX ADMIN — DORAVIRINE 100 MILLIGRAM(S): 100 TABLET, FILM COATED ORAL at 21:23

## 2024-03-06 RX ADMIN — LACOSAMIDE 150 MILLIGRAM(S): 50 TABLET ORAL at 05:04

## 2024-03-06 RX ADMIN — DARUNAVIR, COBICISTAT, EMTRICITABINE, AND TENOFOVIR ALAFENAMIDE 1 TABLET(S): 800; 150; 200; 10 TABLET, FILM COATED ORAL at 21:24

## 2024-03-06 RX ADMIN — HEPARIN SODIUM 11 UNIT(S)/HR: 5000 INJECTION INTRAVENOUS; SUBCUTANEOUS at 02:27

## 2024-03-06 RX ADMIN — SODIUM CHLORIDE 1000 MILLILITER(S): 9 INJECTION INTRAMUSCULAR; INTRAVENOUS; SUBCUTANEOUS at 03:10

## 2024-03-06 RX ADMIN — PANTOPRAZOLE SODIUM 40 MILLIGRAM(S): 20 TABLET, DELAYED RELEASE ORAL at 06:40

## 2024-03-06 RX ADMIN — Medication 975 MILLIGRAM(S): at 05:15

## 2024-03-06 RX ADMIN — CHLORHEXIDINE GLUCONATE 1 APPLICATION(S): 213 SOLUTION TOPICAL at 05:05

## 2024-03-06 RX ADMIN — SODIUM CHLORIDE 1000 MILLILITER(S): 9 INJECTION INTRAMUSCULAR; INTRAVENOUS; SUBCUTANEOUS at 00:38

## 2024-03-06 RX ADMIN — SODIUM CHLORIDE 500 MILLILITER(S): 9 INJECTION INTRAMUSCULAR; INTRAVENOUS; SUBCUTANEOUS at 06:41

## 2024-03-06 RX ADMIN — POLYETHYLENE GLYCOL 3350 17 GRAM(S): 17 POWDER, FOR SOLUTION ORAL at 11:11

## 2024-03-06 RX ADMIN — LEVETIRACETAM 1000 MILLIGRAM(S): 250 TABLET, FILM COATED ORAL at 09:47

## 2024-03-06 RX ADMIN — Medication 975 MILLIGRAM(S): at 06:00

## 2024-03-06 RX ADMIN — HEPARIN SODIUM 9 UNIT(S)/HR: 5000 INJECTION INTRAVENOUS; SUBCUTANEOUS at 19:51

## 2024-03-06 NOTE — PROGRESS NOTE ADULT - ASSESSMENT
Patient is a 32 y/o M with a PMHx of HIV, seizure disorder, and previous CVA who presented [place] [facility] with new onset left sided weakness c/b seizure in the parking lot, CT head showing acute CVA s/p TNK, transferred to [facility] for further management. Patient states that he was in his usual state of health until out shopping yesterday when he suddenly fell onto his left side and noticed he couldn't move his right arm or leg. Patient's girlfriend brought him to [facility] to be evaluated, but he had a witnessed fall with seizure in the parking lot. Patient had a CTA that showed right MCA occlusion s/p TNK and transferred to [facility] for a mechanical thrombectomy. Patient is now in neuro ICU with greatly improving neurological symptoms. Patient states he did not know he had a stroke previously, and it was never worked up. Patient denies any active fevers, chills, CP, SOB, abdominal pain, N/V/D.

## 2024-03-06 NOTE — PROGRESS NOTE ADULT - SUBJECTIVE AND OBJECTIVE BOX
Patient feels well.  Worked with therapy and is independent.     FUNCTIONAL PROGRESS  3/5 PT  Transfer: Sit to Stand:     · Level of [place]	independent    Transfer: Stand to Sit:     · Level of [place]	independent    Gait Skills:     · Level of [place]	independent  · Assistive Device	none  · Gait Distance	200 feet    Gait Analysis:     · Gait Pattern Used	2-point gait  · Gait Deviations Noted	verbal cues needed to scan horizon for obstacles while ambulating 2*2 pre-existing left visual field cut    Stair Negotiation:     · Level of [place]	independent  · Weight-Bearing Restrictions	weight-bearing as tolerated  · Assistive Device	right rail up  · Stair Pattern	step to step  · Number of Stairs	10    3/5 OT  Bathing Training:     · Level of [place]	independent    Upper Body Dressing Training:     · Level of [place]	independent; to don gown    Lower Body Dressing Training:     · Level of [place]	independent; to don socks    Toilet Hygiene Training:     · Level of [place]	not observed    Grooming Training:     · Level of [place]	independent    Eating/Self-Feeding Training:     · Level of [place]	independent      VITALS  T(C): 37 (03-06-24 @ 04:17), Max: 37 (03-06-24 @ 04:17)  HR: 57 (03-06-24 @ 07:00) (54 - 81)  BP: 90/52 (03-06-24 @ 07:00) (74/45 - 122/58)  RR: 14 (03-06-24 @ 07:00) (11 - 21)  SpO2: 98% (03-06-24 @ 07:00) (96% - 100%)  Wt(kg): --    MEDICATIONS   acetaminophen     Tablet .. 975 milliGRAM(s) every 6 hours PRN  benzocaine/menthol Lozenge 1 Lozenge every 3 hours PRN  chlorhexidine 2% Cloths 1 Application(s) <User Schedule>  darunavir 800 mG/cobicistat 150 mG/emtricitabine 200 mG/tenofovir alafenamide 10 mG (SYMTUZA) 1 Tablet(s) daily  doravirine 100 milliGRAM(s) daily  heparin  Infusion 1100 Unit(s)/Hr <Continuous>  lacosamide 150 milliGRAM(s) two times a day  levETIRAcetam   Injectable 1000 milliGRAM(s) daily  levETIRAcetam   Injectable 1500 milliGRAM(s) at bedtime  pantoprazole    Tablet 40 milliGRAM(s) before breakfast  polyethylene glycol 3350 17 Gram(s) daily  senna 2 Tablet(s) at bedtime  sodium chloride 0.9%. 1000 milliLiter(s) <Continuous>      RECENT LABS/IMAGING  - Reviewed Today                        12.7   4.86  )-----------( 195      ( 06 Mar 2024 02:10 )             35.7     03-06    139  |  102  |  8.6  ----------------------------<  91  4.1   |  23.0  |  0.82    Ca    8.8      06 Mar 2024 02:10  Phos  3.7     03-06  Mg     2.0     03-06    TPro  7.1  /  Alb  4.0  /  TBili  0.3<L>  /  DBili  0.1  /  AST  17  /  ALT  10  /  AlkPhos  72  03-06    PT/INR - ( 06 Mar 2024 02:10 )   PT: 11.8 sec;   INR: 1.07 ratio         PTT - ( 06 Mar 2024 02:10 )  PTT:37.2 sec  Urinalysis Basic - ( 06 Mar 2024 02:10 )    Color: x / Appearance: x / SG: x / pH: x  Gluc: 91 mg/dL / Ketone: x  / Bili: x / Urobili: x   Blood: x / Protein: x / Nitrite: x   Leuk Esterase: x / RBC: x / WBC x   Sq Epi: x / Non Sq Epi: x / Bacteria: x        MRI HEAD - Areas of acute ischemia within the right MCA territory with involvement of the basal ganglia, posterior limb of the right internal capsule, and right insula. No mass effect or associated blood products        ----------------------------------------------------------------------------------------  PHYSICAL EXAM  Constitutional - NAD, Comfortable  Extremities - No edema  Neurologic Exam -                    Cognitive - AAOx4      Communication - Fluent, No dysarthria     Cranial Nerves - decreased sensory left, Chronic left field deficit     FUNCTIONAL MOTOR EXAM - No focal deficits     Sensory - Intact to LT   Psychiatric - Mood stable, Affect WNL  ----------------------------------------------------------------------------------------  ASSESSMENT/PLAN  33yMale hospitalized for an acute CVA  Acute right MCA CVA s/p TNK + mechanical thrombectomy - Heparin DRIP  Seizure DO - KeCassandra steelet  Pain - Tylenol  DVT PPX - SCDs   Rehab/Impaired mobility and function - Patient continues to require hospitalization for the above diagnoses and ongoing active management of comorbid complications (ICU level care, CVA workup) that are substantially impairing functional ability and impairing quality of life.     Patient is functionally independent and plan is HOME.    Will sign off at this time. Thank you for allowing me to be part of your patient's care. Please reconsult PMR for additional rehab recommendations or dispo needs if functional status changes. Discussed the specific management and recommendations above with rehab clinical care team/rehab liaison.

## 2024-03-06 NOTE — PROGRESS NOTE ADULT - CRITICAL CARE ATTENDING COMMENT
Pt is critically ill and at high risk of rapid deterioration/death due to abovementioned conditions.   Still requires critical care interventions - additional time spent for ongoing frequent evaluations, interventions and management adjustment by the Attending and ICU team, - and included review of relevant history, clinical examination, review of data and images, discussion of treatment with the patient himself, multidisciplinary team and any consultants involved in this patient’s care.
Pt is critically ill and at high risk of rapid deterioration/death due to abovementioned conditions.   Still requires critical care interventions - additional time spent for ongoing frequent evaluations, interventions and management adjustment by the Attending and ICU team, - and included review of relevant history, clinical examination, review of data and images, discussion of treatment with the patient himself, multidisciplinary team and any consultants involved in this patient’s care.

## 2024-03-06 NOTE — PROGRESS NOTE ADULT - NS ATTEND AMEND GEN_ALL_CORE FT
Patient doing well but had an episode of dizziness, BP was in 70s. TTE showed an LV thrombus with aneurysmal LV and EF of 50%.  Patient states he was diagnosed with HIV one year after his initial presumed stroke.      Underlying hypercoaguable state?  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

## 2024-03-06 NOTE — PROGRESS NOTE ADULT - SUBJECTIVE AND OBJECTIVE BOX
NIGHTTIME ROUNDS    Recent events: none reported.  Episodes of SBP 80-90 noted - patient remains neurologically stable.  Reports intermittent L hand numbness.    Available labs, vitals, imaging reviewed.    Exam:  AO x4, FC, participates in conversation, asks appropriate questions, comprehension seems intact, speech clear, EO spont, face symmetric, tongue midline, EOMI, PERRLA, stable left homonymous hemianopsia present (reported as old), motor - TRAORE strongly, with no drift.  Sensation intact to LT.  CTAB  S1S2 present  Abd soft, NT, ND  No peripheral edema. R groin with no signs of hematoma or active bleeding, mild tenderness noted. Pulses present.

## 2024-03-06 NOTE — PROGRESS NOTE ADULT - SUBJECTIVE AND OBJECTIVE BOX
Pt S/p TNK @ 1654 3/4  [facility]  SHAAN M1 Occlusion  Found w/ LV thrombus  Tfx to ICU post intervention    33M PMH Seizure d/o on Keppra and Vimpat, Prior CVA, HIV on Symtuza Tfx from [facility] following Lt sided weakness and poorly responsive while with his GF. Per chart, Pt was walking in [employer] parking lot when he felt "off", this prompted GF to drive him to [facility] where in the parking lot where he had a seizure and supported to ground by staff. Taken to ED, Stroke code, NIHSS 15 / Pre-MRS 0, CTH and CTA H/N w/ acute ischemic stroke with 2 large vessel occlusions, Right ICA Terminus Occlusion w/ Right Distal M1 Occlusion. Since within window, TNK given @ 1654 and due to LVO, Neuro IR consulted and taken to [facility] for intervention.     Pt underwent [facility] Thrombectomy w/ TiCi 3 Recanalization SHAAN M1 Occlusion. Tfx to ICU post care. While in ICU, 24 hr CTH Neg for bleed. TTE + LV thrombus, cardio consulted and started on Heparin gtt. Hypercoag w/up sent. Per cardio, no RICH given LV as etiology of stroke. Post NIHSS 2 -Vision loss Lt temp. Eventually will need long term A/c-DOAC. Pt OOB, PO diet. Contact for hMPV. Tfx to Neuro SDU.    PMH/PSHX/SHx: See above  No Known Allergies    InPt Meds:  acetaminophen     Tablet .. 975 milliGRAM(s) Oral every 6 hours PRN  benzocaine/menthol Lozenge 1 Lozenge Oral every 3 hours PRN  chlorhexidine 2% Cloths 1 Application(s) Topical <User Schedule>  darunavir 800 mG/cobicistat 150 mG/emtricitabine 200 mG/tenofovir alafenamide 10 mG (SYMTUZA) 1 Tablet(s) Oral daily  doravirine 100 milliGRAM(s) Oral daily  heparin  Infusion 900 Unit(s)/Hr IV Continuous <Continuous>  lacosamide 150 milliGRAM(s) Oral two times a day  levETIRAcetam 1500 milliGRAM(s) Oral at bedtime  levETIRAcetam 1000 milliGRAM(s) Oral daily  pantoprazole    Tablet 40 milliGRAM(s) Oral before breakfast  polyethylene glycol 3350 17 Gram(s) Oral daily  senna 2 Tablet(s) Oral at bedtime    VS:  T(C): 36.9 (03-06-24 @ 08:09), Max: 37 (03-06-24 @ 04:17)  HR: 65 (03-06-24 @ 09:00) (54 - 81)  BP: 91/58 (03-06-24 @ 09:00) (74/45 - 122/58)  RR: 17 (03-06-24 @ 09:00) (11 - 21)  SpO2: 100% (03-06-24 @ 09:00) (96% - 100%)  Diet: PO    Exam:  Gen: NAD  Skin: Warm / Intact  Cardio/Lung: S1,2 RRR CTA  Abd: S/NT/ND  Ext: No edema  : No murphy  Neuro: A/O x 3, Following commands, No drift, TRAORE x 4  NIHSS: 2- vision    Labs:                        12.7   4.86  )-----------( 195      ( 06 Mar 2024 02:10 )             35.7     03-06    139  |  102  |  8.6  ----------------------------<  91  4.1   |  23.0  |  0.82    Ca    8.8      06 Mar 2024 02:10  Phos  3.7     03-06  Mg     2.0     03-06    TPro  7.1  /  Alb  4.0  /  TBili  0.3<L>  /  DBili  0.1  /  AST  17  /  ALT  10  /  AlkPhos  72  03-06    PT/INR - ( 06 Mar 2024 02:10 )   PT: 11.8 sec;   INR: 1.07 ratio    PTT - ( 06 Mar 2024 08:30 )  PTT:84.8 sec    A1C: 4.5    Trigly: 62  Chol: 127  HDL/LDL: 45/70    RVP: + hMPV    TTE: LV Thrombus     LED: Neg    CTH 3/5: No evidence of acute transcortical infarct, acute intracranial hemorrhage, or mass effect.    CT Angio/CT Perfusion @ [facility] 3/4: 1 cm focal nonocclusive thrombus versus severe focal stenosis in the proximal M1 segment of right MCA. 1 cm severe focal stenosis distal right M1 segment of right MCA. Suspected tandem LVOs.    MRI Brain 3/5: Acute Ischemia R MCA, Rt BG, Rt IC, Rt Insula, No hemorrhage     A/P: 33M PMH Seizure d/o on Keppra and Vimpat, Prior CVA, HIV on Symtuza Tfx from [facility] for Acute Non-Hemorraghic CVA - SHAAN M1 Distal Occlusion 2nd to LV thrombus 2nd to r/o Etiology - Hypercoag State POD # 2 Mech Thrombectomy TiCi 3 / TNK.    -Tfx to SDU under Neuro for ongoing w/up  -Heparin gtt started for LV thrombus, PTT 60-80 goal, Thearptic ck CTH, will need long term a/c  -No RICH per cardio given etiology of CVA from LV thrombus  -ASA per neuro  -Hypercoag w/up sent  -Supplemental O2 PRN, Miquel [place]  -Pt on HAART, statin precau, would need to use Crestor  -Neuro cks q 2  -Pain Control PRN  -PO diet  -OOB  -GI/DVT prox    2) Seizure d/o  -Cont Keppra and Vimpat  -Evelyn preca    3) HIV  -Cont Symtuza  -Otpt f/up- CD 4 and viral load     4) + hMPV  -Contact, Tylenol PRN for fevers    5) Dispo  -D/c planning once med stable    Diss w/ attending

## 2024-03-06 NOTE — PROGRESS NOTE ADULT - ASSESSMENT
Assessment:  32 y/o M with acute CVA due to R ICA terminus and RM1and RM2 occlusion, s/p TNK and TICI 3 thrombectomy. Etiology - most likely cardioembolic. MRI reviewed - small-moderate R MCA territory stroke noted, no signs of hemorrhagic transversion.   PMH of remote CVA with L eye visual field deficit, epilepsy on Vimpat and Keppra, HIV.  Cardiomyopathy with aneurysmal apex, borderline LV EF; intracardiac mobile thrombus (apex).  Normocytic anemia, stable.    Plan:  - neurochecks, cont  - maintain -160  - cont Heparin ggt - no boluses, PTT goal 60-80  - hypercoag w/up in progress; pending imaging studies  -rest of the management per daytime team

## 2024-03-06 NOTE — PROGRESS NOTE ADULT - SUBJECTIVE AND OBJECTIVE BOX
Preliminary note, official recommendations pending attending review/signature     [facility] Stroke Team  Progress Note     HPI: 33 year old male PMHx of Seizure disorder (on Keppra and Vimpat), prior stroke, HIV (on antiretrovirals) was in [employer] parking lot with wife on 3/4/24 when he "started feeling off," patient's wife noticed and drove him to [facility], where in the parking lot patient had a witnessed seizure (patient supported and placed on ground by security); on awakening, patient was found to have Left sided paralysis, NIH 15, CT scan showed acute ischemic stroke with 2 large vessel occlusions, Right ICA Terminus Occlusion w/ Right Distal M1 Occlusion. Patient was given tenecteplase by [facility] at ~17:00 on 3/4/24, and was then transferred to [facility] for possible mechanical thrombectomy. Pt had mechanical thrombectomy 3/4/24 in Neuro IR, was found to have a Right Distal M1 Occlusion s/p TICI 3 Recanalization with 1 Aspiration pass. Pt admitted to Neuro ICU for further monitoring, workup, and care. Stroke neurology team called to evaluate.     SUBJECTIVE: Patient seen and examined at bedside, sitting up in chair, in no apparent distress. No events overnight. Pt reporting intermittent Left hand numbness. No other new neurologic complaints. ROS reported negative unless otherwise noted.    MEDICATIONS:  acetaminophen     Tablet .. 975 milliGRAM(s) Oral every 6 hours PRN  benzocaine/menthol Lozenge 1 Lozenge Oral every 3 hours PRN  chlorhexidine 2% Cloths 1 Application(s) Topical <User Schedule>  darunavir 800 mG/cobicistat 150 mG/emtricitabine 200 mG/tenofovir alafenamide 10 mG (SYMTUZA) 1 Tablet(s) Oral daily  doravirine 100 milliGRAM(s) Oral daily  heparin  Infusion 900 Unit(s)/Hr IV Continuous <Continuous>  lacosamide 150 milliGRAM(s) Oral two times a day  levETIRAcetam 1500 milliGRAM(s) Oral at bedtime  levETIRAcetam 1000 milliGRAM(s) Oral daily  pantoprazole    Tablet 40 milliGRAM(s) Oral before breakfast  polyethylene glycol 3350 17 Gram(s) Oral daily  senna 2 Tablet(s) Oral at bedtime    Vital Signs Last 24 Hrs  T(C): 37 (06 Mar 2024 12:02), Max: 37 (06 Mar 2024 04:17)  T(F): 98.6 (06 Mar 2024 12:02), Max: 98.6 (06 Mar 2024 04:17)  HR: 73 (06 Mar 2024 13:00) (54 - 73)  BP: 113/69 (06 Mar 2024 13:00) (74/45 - 113/69)  BP(mean): 81 (06 Mar 2024 13:00) (56 - 87)  RR: 16 (06 Mar 2024 13:00) (11 - 21)  SpO2: 99% (06 Mar 2024 13:00) (94% - 100%)    Parameters below as of 06 Mar 2024 12:00  Patient On (Oxygen Delivery Method): room air    PHYSICAL EXAM:  General: No acute distress    NEUROLOGICAL EXAM:  Mental status: Awake, alert, oriented x3, speech fluent, follows commands, no neglect, normal memory   Cranial Nerves: Left homonymous hemianopsia. No facial asymmetry, no nystagmus, no dysarthria,  tongue midline  Motor exam: Normal tone, no drift, 5/5 RUE, 5/5 RLE, 5/5 LUE, 5/5 LLE, normal fine finger movements.  Sensation: Intact to light touch   Coordination/ Gait: No dysmetria, gait not tested    LABS:                        12.7   4.86  )-----------( 195      ( 06 Mar 2024 02:10 )             35.7    03-06    139  |  102  |  8.6  ----------------------------<  91  4.1   |  23.0  |  0.82    Ca    8.8      06 Mar 2024 02:10  Phos  3.7     03-06  Mg     2.0     03-06  TPro  7.1  /  Alb  4.0  /  TBili  0.3<L>  /  DBili  0.1  /  AST  17  /  ALT  10  /  AlkPhos  72  03-06  PT/INR - ( 06 Mar 2024 02:10 )   PT: 11.8 sec;   INR: 1.07 ratio         PTT - ( 06 Mar 2024 08:30 )  PTT:84.8 sec    IMAGING:     TTE W or WO Ultrasound Enhancing Agent (03.05.24 @ 13:19)  CONCLUSIONS:   1. Left ventricular systolic function is normal with an ejection fraction of 50 % by Henriquez's method of disks. Regional wall motion abnormalities present.   2. Entire apex is abnormal.   3. There is a mobile left ventricular thrombus located in the apex.   4. Mildly enlarged right ventricular cavity size and normal systolic function.   5. Agitated saline injection was negative for intracardiac shunt.   6. The interatrial septum appears intact.   7. No pericardial effusion seen.   8. No prior echocardiogram is available for comparison.    FINDINGS:     Left Ventricle:  After obtaining consent, Definity ultrasound enhancing agent was given for enhanced left ventricular opacification and improved delineation of the left ventricular endocardial borders. The left ventricular cavity is normal in size. Left ventricular systolic function is normal with a calculated ejection fraction of 50 % by the Henriquez's biplane method of disks. There are regional wall motion abnormalities present. There is normal left ventricular diastolic function. There is normal LV mass and concentric remodeling. Left ventricular global longitudinal strain is -15.3 % is abnormal (> -16%). Images were acquired on a "[employer] ultrasound system and processed using [employer] strain analysis software with a heart rate of 66 bpm and a blood pressure of 87/61 mmHg. 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 There is a mobile left ventricular thrombus located in the apex, measuring 14 mm x 9 mm.  LV Wall Scoring: The entire apex is aneurysmal.     CT Head No Cont (03.06.24 @ 11:34)  IMPRESSION:  1.  Redemonstrated discrimination of the right basal ganglia when   compared to the left, compatible with patient's recent MCA distribution   infarct. No evidence of betsy hemorrhagic transmission this time.  2.  Chronic ischemic changes as discussed above.    Xray Chest 1 View- PORTABLE-Urgent (Xray Chest 1 View- PORTABLE-Urgent .) (03.05.24 @ 17:55)  IMPRESSION:  Negative radiograph    MR Head w/wo IV Cont (03.05.24 @ 16:41)  IMPRESSION:  Areas of acute ischemia within the right MCA territory with involvement   of the basal ganglia, posterior limb of the right internal capsule, and   right insula. No mass effect or associated blood products    US Duplex Venous Lower Ext Complete, Bilateral (03.05.24 @ 11:16)   IMPRESSION:  No evidence of deep venous thrombosis in either lower extremity.    CT Head No Cont (03.05.24 @ 05:48)  IMPRESSION:  No evidence of acute transcortical infarct, acute intracranial   hemorrhage, or mass effect.    CT Angio/CT Perfusion @ [facility] (03.04.2024 @ 16:09)  IMPRESSION:  CT PERFUSION demonstrated: Technically suboptimal.  If symptoms persist consider follow up head CT or MRI, MRA  if no contraindication.    CT RAPID PERFUSION: Technically suboptimal.  CBF<30% volume: 0 ml  Tmax>6.0 s volume: 0 ml  Mismatch volume: 0 ml  Mismatch ratio: none    CORE INFARCT: None.  TISSUE AT RISK: None.  MISMATCH RATIO: None.    CTA COW:  1 cm focal nonocclusive thrombus versus severe focal stenosis in the proximal M1 segment of right MCA. 1 cm severe focal stenosis distal right M1 segment of right MCA. Suspected tandem LVOs.    CTA NECK: Patent, ECAs, ICAs, no  hemodynamically significant stenosis at  ICA origins by NASCET criteria.  Bilateral vertebral arteries are patent without flow limiting stenosis.    CT Head @ [facility] (03.04.2024 @ 15:55)  IMPRESSION:  HEAD CT: No acute hemorrhage or midline shift.  Chronic infarct right PCA territory.     [facility] Stroke Team  Progress Note     HPI: 33 year old male PMHx of Seizure disorder (on Keppra and Vimpat), prior stroke, HIV (on antiretrovirals) was in [employer] parking lot with wife on 3/4/24 when he "started feeling off," patient's wife noticed and drove him to [facility], where in the parking lot patient had a witnessed seizure (patient supported and placed on ground by security); on awakening, patient was found to have Left sided paralysis, NIH 15, CT scan showed acute ischemic stroke with 2 large vessel occlusions, Right ICA Terminus Occlusion w/ Right Distal M1 Occlusion. Patient was given tenecteplase by [facility] at ~17:00 on 3/4/24, and was then transferred to [facility] for possible mechanical thrombectomy. Pt had mechanical thrombectomy 3/4/24 in Neuro IR, was found to have a Right Distal M1 Occlusion s/p TICI 3 Recanalization with 1 Aspiration pass. Pt admitted to Neuro ICU for further monitoring, workup, and care. Stroke neurology team called to evaluate.     SUBJECTIVE: Patient seen and examined at bedside, sitting up in chair, in no apparent distress. No events overnight. Pt reporting intermittent Left hand numbness. No other new neurologic complaints. ROS reported negative unless otherwise noted.    MEDICATIONS:  acetaminophen     Tablet .. 975 milliGRAM(s) Oral every 6 hours PRN  benzocaine/menthol Lozenge 1 Lozenge Oral every 3 hours PRN  chlorhexidine 2% Cloths 1 Application(s) Topical <User Schedule>  darunavir 800 mG/cobicistat 150 mG/emtricitabine 200 mG/tenofovir alafenamide 10 mG (SYMTUZA) 1 Tablet(s) Oral daily  doravirine 100 milliGRAM(s) Oral daily  heparin  Infusion 900 Unit(s)/Hr IV Continuous <Continuous>  lacosamide 150 milliGRAM(s) Oral two times a day  levETIRAcetam 1500 milliGRAM(s) Oral at bedtime  levETIRAcetam 1000 milliGRAM(s) Oral daily  pantoprazole    Tablet 40 milliGRAM(s) Oral before breakfast  polyethylene glycol 3350 17 Gram(s) Oral daily  senna 2 Tablet(s) Oral at bedtime    Vital Signs Last 24 Hrs  T(C): 37 (06 Mar 2024 12:02), Max: 37 (06 Mar 2024 04:17)  T(F): 98.6 (06 Mar 2024 12:02), Max: 98.6 (06 Mar 2024 04:17)  HR: 73 (06 Mar 2024 13:00) (54 - 73)  BP: 113/69 (06 Mar 2024 13:00) (74/45 - 113/69)  BP(mean): 81 (06 Mar 2024 13:00) (56 - 87)  RR: 16 (06 Mar 2024 13:00) (11 - 21)  SpO2: 99% (06 Mar 2024 13:00) (94% - 100%)    Parameters below as of 06 Mar 2024 12:00  Patient On (Oxygen Delivery Method): room air    PHYSICAL EXAM:  General: No acute distress    NEUROLOGICAL EXAM:  Mental status: Awake, alert, oriented x3, speech fluent, follows commands, no neglect, normal memory   Cranial Nerves: Left homonymous hemianopsia. No facial asymmetry, no nystagmus, no dysarthria,  tongue midline  Motor exam: Normal tone, no drift, 5/5 RUE, 5/5 RLE, 5/5 LUE, 5/5 LLE, normal fine finger movements.  Sensation: Intact to light touch   Coordination/ Gait: No dysmetria, gait not tested    LABS:                        12.7   4.86  )-----------( 195      ( 06 Mar 2024 02:10 )             35.7    03-06    139  |  102  |  8.6  ----------------------------<  91  4.1   |  23.0  |  0.82    Ca    8.8      06 Mar 2024 02:10  Phos  3.7     03-06  Mg     2.0     03-06  TPro  7.1  /  Alb  4.0  /  TBili  0.3<L>  /  DBili  0.1  /  AST  17  /  ALT  10  /  AlkPhos  72  03-06  PT/INR - ( 06 Mar 2024 02:10 )   PT: 11.8 sec;   INR: 1.07 ratio         PTT - ( 06 Mar 2024 08:30 )  PTT:84.8 sec    IMAGING:     TTE W or WO Ultrasound Enhancing Agent (03.05.24 @ 13:19)  CONCLUSIONS:   1. Left ventricular systolic function is normal with an ejection fraction of 50 % by Henriquez's method of disks. Regional wall motion abnormalities present.   2. Entire apex is abnormal.   3. There is a mobile left ventricular thrombus located in the apex.   4. Mildly enlarged right ventricular cavity size and normal systolic function.   5. Agitated saline injection was negative for intracardiac shunt.   6. The interatrial septum appears intact.   7. No pericardial effusion seen.   8. No prior echocardiogram is available for comparison.    FINDINGS:     Left Ventricle:  After obtaining consent, Definity ultrasound enhancing agent was given for enhanced left ventricular opacification and improved delineation of the left ventricular endocardial borders. The left ventricular cavity is normal in size. Left ventricular systolic function is normal with a calculated ejection fraction of 50 % by the Henriquez's biplane method of disks. There are regional wall motion abnormalities present. There is normal left ventricular diastolic function. There is normal LV mass and concentric remodeling. Left ventricular global longitudinal strain is -15.3 % is abnormal (> -16%). Images were acquired on a [employer] ultrasound system and processed using [employer] strain analysis software with a heart rate of 66 bpm and a blood pressure of 87/61 mmHg. 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 There is a mobile left ventricular thrombus located in the apex, measuring 14 mm x 9 mm.  LV Wall Scoring: The entire apex is aneurysmal.     CT Head No Cont (03.06.24 @ 11:34)  IMPRESSION:  1.  Redemonstrated discrimination of the right basal ganglia when   compared to the left, compatible with patient's recent MCA distribution   infarct. No evidence of betsy hemorrhagic transmission this time.  2.  Chronic ischemic changes as discussed above.    Xray Chest 1 View- PORTABLE-Urgent (Xray Chest 1 View- PORTABLE-Urgent .) (03.05.24 @ 17:55)  IMPRESSION:  Negative radiograph    MR Head w/wo IV Cont (03.05.24 @ 16:41)  IMPRESSION:  Areas of acute ischemia within the right MCA territory with involvement   of the basal ganglia, posterior limb of the right internal capsule, and   right insula. No mass effect or associated blood products    US Duplex Venous Lower Ext Complete, Bilateral (03.05.24 @ 11:16)   IMPRESSION:  No evidence of deep venous thrombosis in either lower extremity.    CT Head No Cont (03.05.24 @ 05:48)  IMPRESSION:  No evidence of acute transcortical infarct, acute intracranial   hemorrhage, or mass effect.    CT Angio/CT Perfusion @ [facility] (03.04.2024 @ 16:09)  IMPRESSION:  CT PERFUSION demonstrated: Technically suboptimal.  If symptoms persist consider follow up head CT or MRI, MRA  if no contraindication.    CT RAPID PERFUSION: Technically suboptimal.  CBF<30% volume: 0 ml  Tmax>6.0 s volume: 0 ml  Mismatch volume: 0 ml  Mismatch ratio: none    CORE INFARCT: None.  TISSUE AT RISK: None.  MISMATCH RATIO: None.    CTA COW:  1 cm focal nonocclusive thrombus versus severe focal stenosis in the proximal M1 segment of right MCA. 1 cm severe focal stenosis distal right M1 segment of right MCA. Suspected tandem LVOs.    CTA NECK: Patent, ECAs, ICAs, no  hemodynamically significant stenosis at  ICA origins by NASCET criteria.  Bilateral vertebral arteries are patent without flow limiting stenosis.    CT Head @ [facility] (03.04.2024 @ 15:55)  IMPRESSION:  HEAD CT: No acute hemorrhage or midline shift.  Chronic infarct right PCA territory.

## 2024-03-06 NOTE — PROGRESS NOTE ADULT - SUBJECTIVE AND OBJECTIVE BOX
Chief complaint:   Patient is a 33y old  Male who presents with a chief complaint of CVA (05 Mar 2024 08:17)    HPI:  HPI: 33 year old male PMHx of Seizure disorder, previous CVA, HIV was AOX3 in [facility] emergency room awaiting transfer to [facility] for cva management after seizure in parking lot of  [facility] witnessed by security ( supported and placed on ground by same-no fall/trauma) patient found on awakening to have left sided paralysis , cat scan showed stroke/2 large vessel occlusions received TNK by [facility], prior to transfer patient moving left side arm and leg weakly now    24h Events  3/5 - Stability MRI with small area of infarct isolated to R BG/Insula, no ICH. TTE with LV Thrombus with LV aneurysm and WMA. Started Heparin  3/6 - Heparin therapeutic, stability CTH with ICH    PHYSICAL EXAM:  General: Calm  HEENT: MMM  Neuro:  -Mental status- No acute distress  -CN- PERRL 3mm, EOMI, tongue midline, L facial  mild R hemiparesis  L VF deficit from prior stroke    CV: RRR  Pulm: clear to auscultation  Abd: Soft, nontender, nondistended  Ext: no noted edema in lower ext  Skin: warm, dry    ------------------------------------------------------------------------------------------------------  ICU Vital Signs Last 24 Hrs  T(C): 36.7 (05 Mar 2024 12:09), Max: 36.9 (05 Mar 2024 00:03)  T(F): 98.1 (05 Mar 2024 12:09), Max: 98.5 (05 Mar 2024 00:03)  HR: 81 (05 Mar 2024 12:00) (62 - 81)  BP: 122/58 (05 Mar 2024 12:00) (83/59 - 122/58)  BP(mean): 79 (05 Mar 2024 12:00) (65 - 82)  ABP: --  ABP(mean): --  RR: 14 (05 Mar 2024 12:00) (11 - 19)  SpO2: 98% (05 Mar 2024 12:00) (94% - 100%)    O2 Parameters below as of 05 Mar 2024 12:00  Patient On (Oxygen Delivery Method): room air            I&O's Summary    04 Mar 2024 07:01  -  05 Mar 2024 07:00  --------------------------------------------------------  IN: 1470 mL / OUT: 1750 mL / NET: -280 mL    05 Mar 2024 07:01  -  05 Mar 2024 12:48  --------------------------------------------------------  IN: 570 mL / OUT: 850 mL / NET: -280 mL        MEDICATIONS  (STANDING):  chlorhexidine 2% Cloths 1 Application(s) Topical <User Schedule>  darunavir 800 mG/cobicistat 150 mG/emtricitabine 200 mG/tenofovir alafenamide 10 mG (SYMTUZA) 1 Tablet(s) Oral daily  doravirine 100 milliGRAM(s) Oral daily  lacosamide 150 milliGRAM(s) Oral two times a day  levETIRAcetam   Injectable 1500 milliGRAM(s) IV Push at bedtime  pantoprazole    Tablet 40 milliGRAM(s) Oral before breakfast  polyethylene glycol 3350 17 Gram(s) Oral daily  senna 2 Tablet(s) Oral at bedtime      RESPIRATORY:      IMAGING:   Recent imaging studies were reviewed.    LAB RESULTS:                          11.9   5.34  )-----------( 177      ( 05 Mar 2024 03:10 )             33.8           03-05    139  |  105  |  9.3  ----------------------------<  95  3.9   |  23.0  |  0.82    Ca    8.4      05 Mar 2024 03:10  Phos  3.8     03-05  Mg     2.3     03-05

## 2024-03-06 NOTE — PROGRESS NOTE ADULT - SUBJECTIVE AND OBJECTIVE BOX
[facility]                                                         CARDIOLOGY AT [place] [address]                                                         Telephone: [phone]. Fax:[phone]                                                                             PROGRESS NOTE    Chief Complaint upon presentation to hospital: CVA  Initial reason for Consult: CVA  Reason for follow up: LV Thrombus   Last 24h Telemetry: NSR 70s no ectopy   Overall Plan: patient no longer needs RICH. Etiology of CVA is thromboembolic from LV thrombus. Pt needs full anticoagulation.     Review of symptoms:   Cardiac:  No chest pain. No dyspnea. No palpitations.  Respiratory: no cough. No dyspnea  Gastrointestinal: No diarrhea. No abdominal pain. No bleeding.   Neuro: No focal neuro complaints.    Vitals:  T(C): 36.9 (03-06-24 @ 08:09), Max: 37 (03-06-24 @ 04:17)  HR: 65 (03-06-24 @ 09:00) (54 - 81)  BP: 91/58 (03-06-24 @ 09:00) (74/45 - 122/58)  RR: 17 (03-06-24 @ 09:00) (11 - 21)  SpO2: 100% (03-06-24 @ 09:00) (96% - 100%)  Wt(kg): --  I&O's Summary    05 Mar 2024 07:01  -  06 Mar 2024 07:00  --------------------------------------------------------  IN: 3955 mL / OUT: 1550 mL / NET: 2405 mL    06 Mar 2024 07:01  -  06 Mar 2024 09:36  --------------------------------------------------------  IN: 86 mL / OUT: 700 mL / NET: -614 mL      Weight (kg): 74.1 (03-06 @ 01:32)    PHYSICAL EXAM:  Appearance: Comfortable. No acute distress  HEENT:  Atraumatic. Normocephalic.  Normal oral mucosa  Neurologic: A & O x 3, no gross focal deficits.  Cardiovascular: RRR S1 S2, No murmur, no rubs/gallops. No JVD  Respiratory: Lungs clear to auscultation, unlabored   Gastrointestinal:  Soft, Non-tender, + BS  Lower Extremities: 2+ Peripheral Pulses, No clubbing, cyanosis, or edema  Psychiatry: Patient is calm. No agitation.   Skin: warm and dry.    CURRENT CARDIAC MEDICATIONS:      CURRENT OTHER MEDICATIONS:  darunavir 800 mG/cobicistat 150 mG/emtricitabine 200 mG/tenofovir alafenamide 10 mG (SYMTUZA) 1 Tablet(s) Oral daily  doravirine 100 milliGRAM(s) Oral daily  acetaminophen     Tablet .. 975 milliGRAM(s) Oral every 6 hours PRN Temp greater or equal to 38C (100.4F), Mild Pain (1 - 3)  lacosamide 150 milliGRAM(s) Oral two times a day  levETIRAcetam 1500 milliGRAM(s) Oral at bedtime  levETIRAcetam 1000 milliGRAM(s) Oral daily  pantoprazole    Tablet 40 milliGRAM(s) Oral before breakfast  polyethylene glycol 3350 17 Gram(s) Oral daily  senna 2 Tablet(s) Oral at bedtime  benzocaine/menthol Lozenge 1 Lozenge Oral every 3 hours PRN Sore Throat  chlorhexidine 2% Cloths 1 Application(s) Topical <User Schedule>  heparin  Infusion 900 Unit(s)/Hr (9 mL/Hr) IV Continuous <Continuous>      LABS:	 	                            12.7   4.86  )-----------( 195      ( 06 Mar 2024 02:10 )             35.7     03-06    139  |  102  |  8.6  ----------------------------<  91  4.1   |  23.0  |  0.82    Ca    8.8      06 Mar 2024 02:10  Phos  3.7     03-06  Mg     2.0     03-06    TPro  7.1  /  Alb  4.0  /  TBili  0.3<L>  /  DBili  0.1  /  AST  17  /  ALT  10  /  AlkPhos  72  03-06    PT/INR/PTT ( 06 Mar 2024 08:30 )                       :                       :      X            :       84.8                  .        .                   .              .           .       X           .                                       Lipid Profile: Date: 03-06 @ 02:10  Total cholesterol 127; Direct LDL: --; HDL: 45; Triglycerides:62    HgA1c:   TSH: Thyroid Stimulating Hormone, Serum: 4.24 uIU/mL

## 2024-03-06 NOTE — PROGRESS NOTE ADULT - PROBLEM SELECTOR PLAN 1
- Acute R MCA occlusion s/p TNK and mechanical thrombectomy.   - echo shows low normal EF 50% w/ regional wall motion abnormalities, apex is abnormal   - there is mobile LV thrombus in apex  - continue heparin infusion full AC   - eventual transition to DOAC   - Would check a hypercoagulable workup.   - LE venous duplex.   - will discuss workup for WMA and mildly reduced EF

## 2024-03-06 NOTE — PROGRESS NOTE ADULT - NS ATTEND AMEND GEN_ALL_CORE FT
Patient seen and examined by me.    Patient came from [facility] at age 13. No family history of any cardiac problems      Patient alert and awake.  Chest- Bilateral Clear BS  Cardiac- S1 and S2  Abdomen- Soft    Assessment/Plan:  1. CVA  2. LV thrombus with RWMA    Patient needs Coronary CTA    I have discussed my recommendation with the PA which are outlined above.  Will follow.

## 2024-03-06 NOTE — PROGRESS NOTE ADULT - ASSESSMENT
ASSESSMENT:  33 year old male PMHx of Seizure disorder (on Keppra and Vimpat), prior stroke, HIV (on antiretrovirals) was in [employer] parking lot with wife on 3/4/24 when he "started feeling off," patient's wife noticed and drove him to [facility], where in the parking lot patient had a witnessed seizure (patient supported and placed on ground by security); on awakening, patient was found to have Left sided paralysis, NIH 15, CT scan showed acute ischemic stroke with 2 large vessel occlusions, Right ICA Terminus Occlusion w/ Right Distal M1 Occlusion. Patient was given tenecteplase by [facility] at ~17:00 on 3/4/24, and was then transferred to [facility] for possible mechanical thrombectomy. Pt had mechanical thrombectomy 3/4/24 in Neuro IR, was found to have a Right Distal M1 Occlusion s/p TICI 3 Recanalization with 1 Aspiration pass.     Patient now with mobile LV thrombus 14mm x 9mm and aneurysmal apex, EF 50%. Patient started on heparin gtt, was originally planned for RICH today however pending plan regarding exam per cardiology.   Spoke further with patient today, patient stated he was diagnosed with HIV 1-2 years after his initial hospitalization for seizure/prior stroke. Patient hypotensive and asymptomatic throughout earlier this morning, s/p IVF bolus x 2 (total 1500mL). New onset intermittent Left hand numbness likely unrelated to stroke, ?pinched nerve due to BP cuff compression. Patient on antiretroviral therapy for HIV, endorses history of seizure disorder but denied any knowledge of prior stroke. Patient states he had his first seizure at age 26 and had workup done at a hospital in [place] including lumbar puncture. 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 Ron Chan, is on Keppra and Vimpat outpatient for management of seizures.     Multiple competing mechanisms in regards to etiology -- highly suspicious for cardioembolic source given LV thrombus however EF 50%, patient reports during prior hospitalization at age 26 when he lost peripheral vision in his Left eye (likely when the prior stroke occurred) he was not yet HIV+. Suspicious for underlying additional hypercoagulable state/coagulopathy. Patient compliant on HAART (states his HIV is "undetectable" per ID) however possible contributory factor of HIV vasculopathy, less likely given no evidence of HIV CNS vasculopathy on imaging. Pending further workup.     NEURO:   -Neurologically patient only with BASELINE Left homonymous hemianopsia from prior to this admission/event  -Continue close monitoring for neurologic deterioration    -Stroke neuro checks q 2 hours  -Patient to remain in Neuro ICU at this time pending clinical course due to periodic hypotension, LV thrombus, newly initiated heparin drip in the context of stroke   -Permissive HTN, avoiding rapid fluctuations and hypotension -- patient asymptomatic in periodic hypotension, neurologically tolerating   -ANTITHROMBOTIC THERAPY: On heparin drip at this time  -F/u hypercoagulable workup   -Recommend CT Chest w/ IV Contrast, CT Abdomen & Pelvis w/ IV Contrast, US Testicles to r/o malignancy  -Recommend Rapid Plasma Reagin  -LDL 70 -- no indication for statin therapy at this time  -MR Head as noted   -US Duplex Venous Bilateral LE negative for DVT   -Dysphagia screen: PASS  -Physical therapy/OT/Speech eval/treatment.   -?RICH, per cardiology  -Cardiac monitoring w/ telemetry for now, further evaluation pending findings of noted workup  , +/- ILR             -Patient should have all age and risk appropriate malignancy screenings with PCP or sooner if clinically suspected   -DVT ppx: Heparin gtt [x] SCD[x]    -Maintain adequate hydration    -Monitor for si/sx of infection   -A1C 4.5   -Stroke education     OTHER: Condition and plan of care d/w patient and wife at bedside, questions and concerns addressed.     DISPOSITION: Rehab or home depending on PT eval once stable and workup is complete    CORE MEASURES:        Admission NIHSS: 14     Tenecteplase : [x] YES [] NO      LDL/A1C: Pending/4.5     Depression Screen- if depression hx and/or present      Statin Therapy: Pending lipid panel     Dysphagia Screen: [x] PASS [] FAIL     Smoking [] YES [x] NO      Afib [] YES [x] NO     Stroke Education [x] YES [] NO

## 2024-03-06 NOTE — PROGRESS NOTE ADULT - ASSESSMENT
ASSESSMENT/PLAN:  33M HIV with CVA    Patient is critically ill due to the following diagnoses:  CVA sp TNK    NEURO:  Neurochecks  Heparin for secondary stroke ppx  MRI wo  Continue home AEDs (Keppra 1/1.5g AM/PM, Vimpat 150mg BID)    PULM: RA  Incentive Spirometry as tolerated    CV:  SBP goal:   Appreciate Cards, WMA to be worked-up as OP    RENAL:  K>4 Mg>2    GI:  Continue Diet  GI prophylaxis [x] not indicated [] PPI [] other:  Bowel regimen [] colace [x] senna [] other: miralax    ENDO:   Goal euglycemia (-180)  ISS    HEME/ONC:  Heparin gtt for LV Thrombus, eventual conversion to DOAC  VTE prophylaxis: [x] SCDs [x] AC    ID:  Cont home ART   hMPV (+), Contact Precautions    My full attention was spent providing medically necessary critical care to the patient with details documented in my note above.   Critical care time spent examining patient, reviewing vitals, labs, medications, imaging and discussing with the team goals of care   The combined critical care time provided to the patient was 60 minutes  This time does not include bedside procedures that are documented separately.

## 2024-03-07 DIAGNOSIS — R93.1 ABNORMAL FINDINGS ON DIAGNOSTIC IMAGING OF HEART AND CORONARY CIRCULATION: ICD-10-CM

## 2024-03-07 LAB
ANA TITR SER: NEGATIVE — SIGNIFICANT CHANGE UP
ANION GAP SERPL CALC-SCNC: 15 MMOL/L — SIGNIFICANT CHANGE UP (ref 5–17)
APTT BLD: 57.1 SEC — HIGH (ref 24.5–35.6)
APTT BLD: 64.5 SEC — HIGH (ref 24.5–35.6)
B2 GLYCOPROT1 AB SER QL: NEGATIVE — SIGNIFICANT CHANGE UP
BUN SERPL-MCNC: 11.7 MG/DL — SIGNIFICANT CHANGE UP (ref 8–20)
CALCIUM SERPL-MCNC: 9.4 MG/DL — SIGNIFICANT CHANGE UP (ref 8.4–10.5)
CARDIOLIPIN AB SER-ACNC: NEGATIVE — SIGNIFICANT CHANGE UP
CHLORIDE SERPL-SCNC: 103 MMOL/L — SIGNIFICANT CHANGE UP (ref 96–108)
CO2 SERPL-SCNC: 24 MMOL/L — SIGNIFICANT CHANGE UP (ref 22–29)
CREAT SERPL-MCNC: 0.96 MG/DL — SIGNIFICANT CHANGE UP (ref 0.5–1.3)
EGFR: 108 ML/MIN/1.73M2 — SIGNIFICANT CHANGE UP
FACT V ACT/NOR PPP: 81 % — SIGNIFICANT CHANGE UP (ref 50–150)
GLUCOSE SERPL-MCNC: 94 MG/DL — SIGNIFICANT CHANGE UP (ref 70–99)
HCT VFR BLD CALC: 38.7 % — LOW (ref 39–50)
HGB BLD-MCNC: 13.6 G/DL — SIGNIFICANT CHANGE UP (ref 13–17)
MAGNESIUM SERPL-MCNC: 2.2 MG/DL — SIGNIFICANT CHANGE UP (ref 1.6–2.6)
MCHC RBC-ENTMCNC: 31.9 PG — SIGNIFICANT CHANGE UP (ref 27–34)
MCHC RBC-ENTMCNC: 35.1 GM/DL — SIGNIFICANT CHANGE UP (ref 32–36)
MCV RBC AUTO: 90.8 FL — SIGNIFICANT CHANGE UP (ref 80–100)
PHOSPHATE SERPL-MCNC: 3.8 MG/DL — SIGNIFICANT CHANGE UP (ref 2.4–4.7)
PLATELET # BLD AUTO: 216 K/UL — SIGNIFICANT CHANGE UP (ref 150–400)
POTASSIUM SERPL-MCNC: 3.9 MMOL/L — SIGNIFICANT CHANGE UP (ref 3.5–5.3)
POTASSIUM SERPL-SCNC: 3.9 MMOL/L — SIGNIFICANT CHANGE UP (ref 3.5–5.3)
RBC # BLD: 4.26 M/UL — SIGNIFICANT CHANGE UP (ref 4.2–5.8)
RBC # FLD: 11.9 % — SIGNIFICANT CHANGE UP (ref 10.3–14.5)
SODIUM SERPL-SCNC: 142 MMOL/L — SIGNIFICANT CHANGE UP (ref 135–145)
T PALLIDUM AB TITR SER: NEGATIVE — SIGNIFICANT CHANGE UP
WBC # BLD: 4.77 K/UL — SIGNIFICANT CHANGE UP (ref 3.8–10.5)
WBC # FLD AUTO: 4.77 K/UL — SIGNIFICANT CHANGE UP (ref 3.8–10.5)

## 2024-03-07 PROCEDURE — 99233 SBSQ HOSP IP/OBS HIGH 50: CPT

## 2024-03-07 PROCEDURE — 99232 SBSQ HOSP IP/OBS MODERATE 35: CPT

## 2024-03-07 PROCEDURE — 71260 CT THORAX DX C+: CPT | Mod: 26

## 2024-03-07 PROCEDURE — 74177 CT ABD & PELVIS W/CONTRAST: CPT | Mod: 26

## 2024-03-07 PROCEDURE — 75574 CT ANGIO HRT W/3D IMAGE: CPT | Mod: 26

## 2024-03-07 RX ORDER — SODIUM CHLORIDE 9 MG/ML
500 INJECTION INTRAMUSCULAR; INTRAVENOUS; SUBCUTANEOUS ONCE
Refills: 0 | Status: COMPLETED | OUTPATIENT
Start: 2024-03-07 | End: 2024-03-07

## 2024-03-07 RX ADMIN — SODIUM CHLORIDE 500 MILLILITER(S): 9 INJECTION INTRAMUSCULAR; INTRAVENOUS; SUBCUTANEOUS at 03:30

## 2024-03-07 RX ADMIN — DORAVIRINE 100 MILLIGRAM(S): 100 TABLET, FILM COATED ORAL at 12:09

## 2024-03-07 RX ADMIN — LACOSAMIDE 150 MILLIGRAM(S): 50 TABLET ORAL at 17:28

## 2024-03-07 RX ADMIN — DARUNAVIR, COBICISTAT, EMTRICITABINE, AND TENOFOVIR ALAFENAMIDE 1 TABLET(S): 800; 150; 200; 10 TABLET, FILM COATED ORAL at 12:09

## 2024-03-07 RX ADMIN — HEPARIN SODIUM 9 UNIT(S)/HR: 5000 INJECTION INTRAVENOUS; SUBCUTANEOUS at 07:20

## 2024-03-07 RX ADMIN — LACOSAMIDE 150 MILLIGRAM(S): 50 TABLET ORAL at 05:29

## 2024-03-07 RX ADMIN — LEVETIRACETAM 1500 MILLIGRAM(S): 250 TABLET, FILM COATED ORAL at 21:01

## 2024-03-07 RX ADMIN — Medication 975 MILLIGRAM(S): at 08:42

## 2024-03-07 RX ADMIN — PANTOPRAZOLE SODIUM 40 MILLIGRAM(S): 20 TABLET, DELAYED RELEASE ORAL at 05:30

## 2024-03-07 RX ADMIN — POLYETHYLENE GLYCOL 3350 17 GRAM(S): 17 POWDER, FOR SOLUTION ORAL at 12:08

## 2024-03-07 RX ADMIN — LEVETIRACETAM 1000 MILLIGRAM(S): 250 TABLET, FILM COATED ORAL at 08:06

## 2024-03-07 RX ADMIN — SODIUM CHLORIDE 250 MILLILITER(S): 9 INJECTION, SOLUTION INTRAVENOUS at 02:00

## 2024-03-07 RX ADMIN — Medication 975 MILLIGRAM(S): at 08:05

## 2024-03-07 NOTE — PROGRESS NOTE ADULT - SUBJECTIVE AND OBJECTIVE BOX
Preliminary note, offical recommendations pending attending review/signature   [facility] Stroke Team  Progress Note     HPI:  33 year old male PMHx of Seizure disorder (on Keppra and Vimpat), prior stroke, HIV (on antiretrovirals) was in [employer] parking lot with wife on 3/4/24 when he "started feeling off," patient's wife noticed and drove him to [facility], where in the parking lot patient had a witnessed seizure (patient supported and placed on ground by security); on awakening, patient was found to have Left sided paralysis, NIH 15, CT scan showed acute ischemic stroke with 2 large vessel occlusions, Right ICA Terminus Occlusion w/ Right Distal M1 Occlusion. Patient was given tenecteplase by [facility] at ~17:00 on 3/4/24, and was then transferred to [facility] for possible mechanical thrombectomy. Pt had mechanical thrombectomy 3/4/24 in Neuro IR, was found to have a Right Distal M1 Occlusion s/p TICI 3 Recanalization with 1 Aspiration pass. Pt admitted to Neuro ICU for further monitoring, workup, and care. Stroke neurology team called to evaluate.     SUBJECTIVE: Patient with persistent hypotension with SBP in the 80s-90s overnight.  No new neurologic complaints.  ROS reported negative unless otherwise noted.    acetaminophen     Tablet .. 975 milliGRAM(s) Oral every 6 hours PRN  acetaminophen   IVPB .. 1000 milliGRAM(s) IV Intermittent once  benzocaine/menthol Lozenge 1 Lozenge Oral every 3 hours PRN  chlorhexidine 2% Cloths 1 Application(s) Topical <User Schedule>  darunavir 800 mG/cobicistat 150 mG/emtricitabine 200 mG/tenofovir alafenamide 10 mG (SYMTUZA) 1 Tablet(s) Oral daily  doravirine 100 milliGRAM(s) Oral daily  heparin  Infusion 900 Unit(s)/Hr IV Continuous <Continuous>  lacosamide 150 milliGRAM(s) Oral two times a day  levETIRAcetam 1000 milliGRAM(s) Oral daily  levETIRAcetam 1500 milliGRAM(s) Oral at bedtime  multiple electrolytes Injection Type 1 Bolus 500 milliLiter(s) IV Bolus once  pantoprazole    Tablet 40 milliGRAM(s) Oral before breakfast  polyethylene glycol 3350 17 Gram(s) Oral daily  senna 2 Tablet(s) Oral at bedtime      PHYSICAL EXAM:   Vital Signs Last 24 Hrs  T(C): 36.8 (07 Mar 2024 04:07), Max: 37 (06 Mar 2024 12:02)  T(F): 98.3 (07 Mar 2024 04:07), Max: 98.6 (06 Mar 2024 12:02)  HR: 61 (07 Mar 2024 07:00) (52 - 83)  BP: 102/69 (07 Mar 2024 07:00) (89/73 - 124/94)  BP(mean): 79 (07 Mar 2024 07:00) (66 - 104)  RR: 14 (07 Mar 2024 07:00) (11 - 26)  SpO2: 97% (07 Mar 2024 07:00) (94% - 100%)    Parameters below as of 06 Mar 2024 16:00  Patient On (Oxygen Delivery Method): room air    General: No acute distress.     NEUROLOGICAL EXAM:  Mental status: Awake, alert, oriented x3, speech fluent, follows commands, no neglect, normal memory   Cranial Nerves: No facial asymmetry, no nystagmus, no dysarthria,  tongue midline  Motor exam: Normal tone, no drift, 5/5 RUE, 5/5 RLE, 5/5 LUE, 5/5 LLE, normal fine finger movements.  Sensation: Intact to light touch   Coordination/ Gait: No dysmetria, gait not tested    LABS:                        13.6   4.77  )-----------( 216      ( 07 Mar 2024 03:30 )             38.7    03-07    142  |  103  |  11.7  ----------------------------<  94  3.9   |  24.0  |  0.96    Ca    9.4      07 Mar 2024 03:30  Phos  3.8     03-07  Mg     2.2     03-07    TPro  7.1  /  Alb  4.0  /  TBili  0.3<L>  /  DBili  0.1  /  AST  17  /  ALT  10  /  AlkPhos  72  03-06  PT/INR - ( 06 Mar 2024 02:10 )   PT: 11.8 sec;   INR: 1.07 ratio         PTT - ( 07 Mar 2024 03:30 )  PTT:57.1 sec      03-06 Chol 127 LDL=70 HDL 45 Trig 62    A1C: 4.5    IMAGING: Reviewed by me.     CT Head No Cont (03.06.24 @ 11:34)   IMPRESSION:    1.  Redemonstrated discrimination of the right basal ganglia when   compared to the left, compatible with patient's recent MCA distribution   infarct. No evidence of betsy hemorrhagic transmission this time.  2.  Chronic ischemic changes as discussed above.    MR Head w/wo IV Cont (03.05.24 @ 16:41)   IMPRESSION:  Areas of acute ischemia within the right MCA territory with involvement   of the basal ganglia, posterior limb of the right internal capsule, and   right insula. No mass effect or associated blood products    US Duplex Venous Lower Ext Complete, Bilateral (03.05.24 @ 11:16)   IMPRESSION:  No evidence of deep venous thrombosis in either lower extremity.    CT Head No Cont (03.05.24 @ 05:48)   IMPRESSION:  No evidence of acute transcortical infarct, acute intracranial   hemorrhage, or mass effect.    TTE W or WO Ultrasound Enhancing Agent (03.05.24 @ 13:19)   CONCLUSIONS:   1. Left ventricular systolic function is normal with an ejection fraction of 50 % by Henriquez's method of disks. Regional wall motion abnormalities present.   2. Entire apex is abnormal.   3. There is a mobile left ventricular thrombus located in the apex.   4. Mildly enlarged right ventricular cavity size and normal systolic function.   5. Agitated saline injection was negative for intracardiac shunt.   6. The interatrial septum appears intact.   7. No pericardial effusion seen.   8. No prior echocardiogram is available for comparison.       Preliminary note, offical recommendations pending attending review/signature   [facility] Stroke Team  Progress Note     HPI:  33 year old male PMHx of Seizure disorder (on Keppra and Vimpat), prior stroke, HIV (on antiretrovirals) was in [employer] parking lot with wife on 3/4/24 when he "started feeling off," patient's wife noticed and drove him to [facility], where in the parking lot patient had a witnessed seizure (patient supported and placed on ground by security); on awakening, patient was found to have Left sided paralysis, NIH 15, CT scan showed acute ischemic stroke with 2 large vessel occlusions, Right ICA Terminus Occlusion w/ Right Distal M1 Occlusion. Patient was given tenecteplase by [facility] at ~17:00 on 3/4/24, and was then transferred to [facility] for possible mechanical thrombectomy. Pt had mechanical thrombectomy 3/4/24 in Neuro IR, was found to have a Right Distal M1 Occlusion s/p TICI 3 Recanalization with 1 Aspiration pass. Pt admitted to Neuro ICU for further monitoring, workup, and care. Stroke neurology team called to evaluate.     SUBJECTIVE: Patient with persistent hypotension with SBP in the 80s-90s overnight; complaint of transient dizziness when waking up, now resolved. Continued episodes of left hand numbness, unchanged.  ROS reported negative unless otherwise noted.    acetaminophen     Tablet .. 975 milliGRAM(s) Oral every 6 hours PRN  acetaminophen   IVPB .. 1000 milliGRAM(s) IV Intermittent once  benzocaine/menthol Lozenge 1 Lozenge Oral every 3 hours PRN  chlorhexidine 2% Cloths 1 Application(s) Topical <User Schedule>  darunavir 800 mG/cobicistat 150 mG/emtricitabine 200 mG/tenofovir alafenamide 10 mG (SYMTUZA) 1 Tablet(s) Oral daily  doravirine 100 milliGRAM(s) Oral daily  heparin  Infusion 900 Unit(s)/Hr IV Continuous <Continuous>  lacosamide 150 milliGRAM(s) Oral two times a day  levETIRAcetam 1000 milliGRAM(s) Oral daily  levETIRAcetam 1500 milliGRAM(s) Oral at bedtime  multiple electrolytes Injection Type 1 Bolus 500 milliLiter(s) IV Bolus once  pantoprazole    Tablet 40 milliGRAM(s) Oral before breakfast  polyethylene glycol 3350 17 Gram(s) Oral daily  senna 2 Tablet(s) Oral at bedtime      PHYSICAL EXAM:   Vital Signs Last 24 Hrs  T(C): 36.8 (07 Mar 2024 04:07), Max: 37 (06 Mar 2024 12:02)  T(F): 98.3 (07 Mar 2024 04:07), Max: 98.6 (06 Mar 2024 12:02)  HR: 61 (07 Mar 2024 07:00) (52 - 83)  BP: 102/69 (07 Mar 2024 07:00) (89/73 - 124/94)  BP(mean): 79 (07 Mar 2024 07:00) (66 - 104)  RR: 14 (07 Mar 2024 07:00) (11 - 26)  SpO2: 97% (07 Mar 2024 07:00) (94% - 100%)    Parameters below as of 06 Mar 2024 16:00  Patient On (Oxygen Delivery Method): room air    General: No acute distress.     NEUROLOGICAL EXAM:  Mental status: Awake, alert, oriented x3, speech fluent, follows commands, no neglect, normal memory   Cranial Nerves: No facial asymmetry, no nystagmus, no dysarthria,  tongue midline; +Left homonymous hemianopsia   Motor exam: Normal tone, no drift, 5/5 RUE, 5/5 RLE, 5/5 LUE, 5/5 LLE  Sensation: Intact to light touch  Coordination/ Gait: No dysmetria, gait not tested    LABS:                        13.6   4.77  )-----------( 216      ( 07 Mar 2024 03:30 )             38.7    03-07    142  |  103  |  11.7  ----------------------------<  94  3.9   |  24.0  |  0.96    Ca    9.4      07 Mar 2024 03:30  Phos  3.8     03-07  Mg     2.2     03-07    TPro  7.1  /  Alb  4.0  /  TBili  0.3<L>  /  DBili  0.1  /  AST  17  /  ALT  10  /  AlkPhos  72  03-06  PT/INR - ( 06 Mar 2024 02:10 )   PT: 11.8 sec;   INR: 1.07 ratio         PTT - ( 07 Mar 2024 03:30 )  PTT:57.1 sec      03-06 Chol 127 LDL=70 HDL 45 Trig 62    A1C: 4.5    IMAGING: Reviewed by me.     CT Head No Cont (03.06.24 @ 11:34)   IMPRESSION:    1.  Redemonstrated discrimination of the right basal ganglia when   compared to the left, compatible with patient's recent MCA distribution   infarct. No evidence of betsy hemorrhagic transmission this time.  2.  Chronic ischemic changes as discussed above.    MR Head w/wo IV Cont (03.05.24 @ 16:41)   IMPRESSION:  Areas of acute ischemia within the right MCA territory with involvement   of the basal ganglia, posterior limb of the right internal capsule, and   right insula. No mass effect or associated blood products    US Duplex Venous Lower Ext Complete, Bilateral (03.05.24 @ 11:16)   IMPRESSION:  No evidence of deep venous thrombosis in either lower extremity.    CT Head No Cont (03.05.24 @ 05:48)   IMPRESSION:  No evidence of acute transcortical infarct, acute intracranial   hemorrhage, or mass effect.    TTE W or WO Ultrasound Enhancing Agent (03.05.24 @ 13:19)   CONCLUSIONS:   1. Left ventricular systolic function is normal with an ejection fraction of 50 % by Henriquez's method of disks. Regional wall motion abnormalities present.   2. Entire apex is abnormal.   3. There is a mobile left ventricular thrombus located in the apex.   4. Mildly enlarged right ventricular cavity size and normal systolic function.   5. Agitated saline injection was negative for intracardiac shunt.   6. The interatrial septum appears intact.   7. No pericardial effusion seen.   8. No prior echocardiogram is available for comparison.         [facility] Stroke Team  Progress Note     HPI:  33 year old male PMHx of Seizure disorder (on Keppra and Vimpat), prior stroke, HIV (on antiretrovirals) was in [employer] parking lot with wife on 3/4/24 when he "started feeling off," patient's wife noticed and drove him to [facility], where in the parking lot patient had a witnessed seizure (patient supported and placed on ground by security); on awakening, patient was found to have Left sided paralysis, NIH 15, CT scan showed acute ischemic stroke with 2 large vessel occlusions, Right ICA Terminus Occlusion w/ Right Distal M1 Occlusion. Patient was given tenecteplase by [facility] at ~17:00 on 3/4/24, and was then transferred to [facility] for possible mechanical thrombectomy. Pt had mechanical thrombectomy 3/4/24 in Neuro IR, was found to have a Right Distal M1 Occlusion s/p TICI 3 Recanalization with 1 Aspiration pass. Pt admitted to Neuro ICU for further monitoring, workup, and care. Stroke neurology team called to evaluate.     SUBJECTIVE: Patient with persistent hypotension with SBP in the 80s-90s overnight; complaint of transient dizziness when waking up, now resolved. Continued episodes of left hand numbness, unchanged. Numbness in certain positions of wrist, first three digits. ROS reported negative unless otherwise noted.    acetaminophen     Tablet .. 975 milliGRAM(s) Oral every 6 hours PRN  acetaminophen   IVPB .. 1000 milliGRAM(s) IV Intermittent once  benzocaine/menthol Lozenge 1 Lozenge Oral every 3 hours PRN  chlorhexidine 2% Cloths 1 Application(s) Topical <User Schedule>  darunavir 800 mG/cobicistat 150 mG/emtricitabine 200 mG/tenofovir alafenamide 10 mG (SYMTUZA) 1 Tablet(s) Oral daily  doravirine 100 milliGRAM(s) Oral daily  heparin  Infusion 900 Unit(s)/Hr IV Continuous <Continuous>  lacosamide 150 milliGRAM(s) Oral two times a day  levETIRAcetam 1000 milliGRAM(s) Oral daily  levETIRAcetam 1500 milliGRAM(s) Oral at bedtime  multiple electrolytes Injection Type 1 Bolus 500 milliLiter(s) IV Bolus once  pantoprazole    Tablet 40 milliGRAM(s) Oral before breakfast  polyethylene glycol 3350 17 Gram(s) Oral daily  senna 2 Tablet(s) Oral at bedtime      PHYSICAL EXAM:   Vital Signs Last 24 Hrs  T(C): 36.8 (07 Mar 2024 04:07), Max: 37 (06 Mar 2024 12:02)  T(F): 98.3 (07 Mar 2024 04:07), Max: 98.6 (06 Mar 2024 12:02)  HR: 61 (07 Mar 2024 07:00) (52 - 83)  BP: 102/69 (07 Mar 2024 07:00) (89/73 - 124/94)  BP(mean): 79 (07 Mar 2024 07:00) (66 - 104)  RR: 14 (07 Mar 2024 07:00) (11 - 26)  SpO2: 97% (07 Mar 2024 07:00) (94% - 100%)    Parameters below as of 06 Mar 2024 16:00  Patient On (Oxygen Delivery Method): room air    General: No acute distress.     NEUROLOGICAL EXAM:  Mental status: Awake, alert, oriented x3, speech fluent, follows commands, no neglect, normal memory   Cranial Nerves: No facial asymmetry, no nystagmus, no dysarthria,  tongue midline; +Left homonymous hemianopsia   Motor exam: Normal tone, no drift, 5/5 RUE, 5/5 RLE, 5/5 LUE, 5/5 LLE  Sensation: Intact to light touch  Coordination/ Gait: No dysmetria, gait not tested    LABS:                        13.6   4.77  )-----------( 216      ( 07 Mar 2024 03:30 )             38.7    03-07    142  |  103  |  11.7  ----------------------------<  94  3.9   |  24.0  |  0.96    Ca    9.4      07 Mar 2024 03:30  Phos  3.8     03-07  Mg     2.2     03-07    TPro  7.1  /  Alb  4.0  /  TBili  0.3<L>  /  DBili  0.1  /  AST  17  /  ALT  10  /  AlkPhos  72  03-06  PT/INR - ( 06 Mar 2024 02:10 )   PT: 11.8 sec;   INR: 1.07 ratio         PTT - ( 07 Mar 2024 03:30 )  PTT:57.1 sec      03-06 Chol 127 LDL=70 HDL 45 Trig 62    A1C: 4.5    IMAGING: Reviewed by me.     CT Head No Cont (03.06.24 @ 11:34)   IMPRESSION:    1.  Redemonstrated discrimination of the right basal ganglia when   compared to the left, compatible with patient's recent MCA distribution   infarct. No evidence of betsy hemorrhagic transmission this time.  2.  Chronic ischemic changes as discussed above.    MR Head w/wo IV Cont (03.05.24 @ 16:41)   IMPRESSION:  Areas of acute ischemia within the right MCA territory with involvement   of the basal ganglia, posterior limb of the right internal capsule, and   right insula. No mass effect or associated blood products    US Duplex Venous Lower Ext Complete, Bilateral (03.05.24 @ 11:16)   IMPRESSION:  No evidence of deep venous thrombosis in either lower extremity.    CT Head No Cont (03.05.24 @ 05:48)   IMPRESSION:  No evidence of acute transcortical infarct, acute intracranial   hemorrhage, or mass effect.    TTE W or WO Ultrasound Enhancing Agent (03.05.24 @ 13:19)   CONCLUSIONS:   1. Left ventricular systolic function is normal with an ejection fraction of 50 % by Henriquez's method of disks. Regional wall motion abnormalities present.   2. Entire apex is abnormal.   3. There is a mobile left ventricular thrombus located in the apex.   4. Mildly enlarged right ventricular cavity size and normal systolic function.   5. Agitated saline injection was negative for intracardiac shunt.   6. The interatrial septum appears intact.   7. No pericardial effusion seen.   8. No prior echocardiogram is available for comparison.

## 2024-03-07 NOTE — PROGRESS NOTE ADULT - PROBLEM SELECTOR PLAN 1
.  - Acute R MCA occlusion s/p TNK and mechanical thrombectomy.   - there is mobile LV thrombus in apex  - continue heparin infusion full AC to assess AC tolerance  - eventual transition to AC, will need likely need coumadin for LV thrombus. Will discuss AC with Dr. Ramos  - Would check a hypercoagulable workup. Also needs scans to assess for malignancy  - 3/6 CT head no hemorrhagic transformation  - LE venous duplex negative for DVT .  - Acute R MCA occlusion s/p TNK and mechanical thrombectomy.   - there is mobile LV thrombus in apex  - continue heparin infusion full AC to assess AC tolerance  - eventual transition to AC, will need likely need coumadin for LV thrombus.   - Would check a hypercoagulable workup. Also needs scans to assess for malignancy  - 3/6 CT head no hemorrhagic transformation  - LE venous duplex negative for DVT  - CCTA pending

## 2024-03-07 NOTE — PROGRESS NOTE ADULT - PROBLEM SELECTOR PLAN 2
.  - echo shows low normal EF 50% w/ regional wall motion abnormalities, apex is abnormal  - will obtain CCTA today for further evaluation of coronary anatomy  - BP soft and HR near goal. no PO BB at this time .  - currently NSR 60s  - echo shows low normal EF 50% w/ regional wall motion abnormalities, apex is abnormal  - does not appear to be organized thrombus  - will obtain CCTA today for further evaluation of coronary anatomy  - BP soft and HR near goal. no PO BB at this time  - no ARB due to hypotension

## 2024-03-07 NOTE — PROGRESS NOTE ADULT - ASSESSMENT
COMPLETE A/P PENDING   ASSESSMENT:   33 year old male PMHx of Seizure disorder (on Keppra and Vimpat), prior stroke, HIV (on antiretrovirals) was in [employer] parking lot with wife on 3/4/24 when he "started feeling off," patient's wife noticed and drove him to [facility], where in the parking lot patient had a witnessed seizure (patient supported and placed on ground by security); on awakening, patient was found to have Left sided paralysis, NIH 15, CT scan showed acute ischemic stroke with 2 large vessel occlusions, Right ICA Terminus Occlusion w/ Right Distal M1 Occlusion. Patient was given tenecteplase by [facility] at ~17:00 on 3/4/24, and was then transferred to [facility] for possible mechanical thrombectomy. Pt had mechanical thrombectomy 3/4/24 in Neuro IR, was found to have a Right Distal M1 Occlusion s/p TICI 3 Recanalization with 1 Aspiration pass.     Patient now with mobile LV thrombus 14mm x 9mm and aneurysmal apex, EF 50%. Started on heparin gtt.     Patient later clarified medical history, stated he was diagnosed with HIV 1-2 years after his initial hospitalization for seizure/prior stroke. Patient on antiretroviral therapy for HIV, endorses history of seizure disorder but denied any knowledge of prior stroke. Patient states he had his first seizure at age 26 and had workup done at a hospital in [place] including lumbar puncture. 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 Ron Chan, is on Keppra and Vimpat outpatient for management of seizures.     Multiple competing mechanisms in regards to etiology -- highly suspicious for cardioembolic source given LV thrombus however EF 50%, patient reports during prior hospitalization at age 26 when he lost peripheral vision in his Left eye (likely when the prior stroke occurred) he was not yet HIV+. Suspicious for underlying additional hypercoagulable state/coagulopathy. Patient compliant on HAART (states his HIV is "undetectable" per ID) however possible contributory factor of HIV vasculopathy, less likely given no evidence of HIV CNS vasculopathy on imaging. Pending further workup.     NEURO:   -Neurologically ---  -Continue close monitoring for neurologic deterioration    -Stroke neuro checks q 2 hours  -Patient to remain in Neuro ICU at this time pending clinical course due to periodic hypotension, LV thrombus, newly initiated heparin drip in the context of stroke   -Permissive HTN, avoiding rapid fluctuations and hypotension -- patient asymptomatic in periodic hypotension, neurologically tolerating   -ANTITHROMBOTIC THERAPY: Heparin gtt, ptt goal 60-80, no bolus   -F/u hypercoagulable workup   -Recommend CT Chest w/ IV Contrast, CT Abdomen & Pelvis w/ IV Contrast  -US Testicles to r/o malignancy completed, pending official read   -Recommend Rapid Plasma Reagin  -LDL 70 -- no indication for statin therapy at this time  -MR Head as noted   -US Duplex Venous Bilateral LE negative for DVT   -Dysphagia screen: PASS  -Physical therapy/OT/Speech eval/treatment.   -?RICH, per cardiology  -Cardiac monitoring w/ telemetry for now, further evaluation pending findings of noted workup  , +/- ILR             -Patient should have all age and risk appropriate malignancy screenings with PCP or sooner if clinically suspected   -DVT ppx: Heparin gtt [x] SCD[x]    -Maintain adequate hydration    -Monitor for si/sx of infection   -A1C 4.5   -Stroke education     OTHER:   -Condition and plan of care d/w patient and wife at bedside, questions and concerns addressed.     DISPOSITION:   -Rehab or home depending on PT eval once stable and workup is complete    CORE MEASURES:        Admission NIHSS: 14     Tenecteplase : [x] YES [] NO      LDL/A1C: 70/4.5     Depression Screen- if depression hx and/or present      Statin Therapy: Not indicated      Dysphagia Screen: [x] PASS [] FAIL     Smoking [] YES [x] NO      Afib [] YES [x] NO     Stroke Education [x] YES [] NO ASSESSMENT:   33 year old male PMHx of Seizure disorder (on Keppra and Vimpat), prior stroke, HIV (on antiretrovirals) was in [employer] parking lot with wife on 3/4/24 when he "started feeling off," patient's wife noticed and drove him to [facility], where in the parking lot patient had a witnessed seizure (patient supported and placed on ground by security); on awakening, patient was found to have Left sided paralysis, NIH 15, CT scan showed acute ischemic stroke with 2 large vessel occlusions, Right ICA Terminus Occlusion w/ Right Distal M1 Occlusion. Patient was given tenecteplase by [facility] at ~17:00 on 3/4/24, and was then transferred to [facility] for possible mechanical thrombectomy. Pt had mechanical thrombectomy 3/4/24 in Neuro IR, was found to have a Right Distal M1 Occlusion s/p TICI 3 Recanalization with 1 Aspiration pass.     Patient now with mobile LV thrombus 14mm x 9mm and aneurysmal apex, EF 50%. Started on heparin gtt.     Patient on antiretroviral therapy for HIV, endorses history of seizure disorder but denied any knowledge of prior stroke. Patient states he had his first seizure at age 26 and had workup done at a hospital in [place] including lumbar puncture. 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 Ron Chan, is on Keppra and Vimpat outpatient for management of seizures. Patient now notes that he was diagnosed with HIV prior this hospitalization.     Multiple competing mechanisms in regards to etiology -- highly suspicious for cardioembolic source given LV thrombus however EF 50%. Suspicious for underlying additional hypercoagulable state/coagulopathy. Patient compliant on HAART (states his HIV is "undetectable" per ID) however possible contributory factor of HIV vasculopathy, less likely given no evidence of HIV CNS vasculopathy on imaging. Pending further workup.     NEURO:   -Neurologically with unchanged left homonymous hemianopsia, otherwise no focal neurologic deficits  -Patient complaining of intermittent numbness/parasthesias in left hand median nerve distribution; suspect peripheral nerve injury from multiple IV/blood draw attempts   -Accepted to transfer to inpatient stroke neurology service   -Continue close monitoring for neurologic deterioration    -Stroke neuro checks q 2 hours  -Permissive HTN, avoiding rapid fluctuations and hypotension -- patient asymptomatic in periodic hypotension, neurologically tolerating   -ANTITHROMBOTIC THERAPY: Heparin gtt, ptt goal 60-80, no bolus ; pending recommendations from cardiology regarding optimal agent for long term anticoagulation    -MR head as above   -LDL 70 -- no indication for statin therapy at this time  -Dysphagia screen: PASS  -Physical therapy/OT/Speech eval/treatment.     CARDIOVASCULAR:  -TTE results as above, + LV thrombus  -Cardiology consult appreciated, pending CT angio chest to evaluate for CAD  -cardiac monitoring w/ telemetry for now, further evaluation pending findings of noted workup                              HEMATOLOGY:   -H/H 13.6/38.7, Platelets 216  -patient should have all age and risk appropriate malignancy screenings with PCP or sooner if clinically suspected   -Hypercoagulable work up thus far negative; f/u pending studies   -Pending CT C/A/P to r/o malignancy   -Testicular US completed, pending official radiology read   -LE duplex negative for DVT   -DVT ppx: Heparin s.c [] LMWH [] Hep gtt [x]     PULMONARY:   -protecting airway, saturating well     RENAL:   -BUN/Cr 11.7/0.96, monitor urine output, maintain adequate hydration    -Na Goal:  135-145    ID:   -afebrile, no leukocytosis, monitor for si/sx of infection   -RPR negative   -Pending HIV viral load count     OTHER:    -condition and plan of care d/w patient and NSICU team; questions and concerns addressed.     DISPOSITION:   -Rehab or home depending on PT eval once stable and workup is complete    OTHER:   -Condition and plan of care d/w patient and wife at bedside, questions and concerns addressed.     DISPOSITION:   -Rehab or home depending on PT eval once stable and workup is complete    CORE MEASURES:        Admission NIHSS: 14     Tenecteplase : [x] YES [] NO      LDL/A1C: 70/4.5     Depression Screen- if depression hx and/or present      Statin Therapy: Not indicated      Dysphagia Screen: [x] PASS [] FAIL     Smoking [] YES [x] NO      Afib [] YES [x] NO     Stroke Education [x] YES [] NO    Obtain screening lower extremity venous ultrasound in patients who meet 1 or more of the following criteria as patient is high risk for DVT/PE on admission:   [] History of DVT/PE  []Hypercoagulable states (Factor V Leiden, Cancer, OCP, etc. )  []Prolonged immobility (hemiplegia/hemiparesis/post operative or any other extended immobilization)  [] Transferred from outside facility (Rehab or Long term care)  [x] Age </= to 50   ASSESSMENT:   33 year old male PMHx of Seizure disorder (on Keppra and Vimpat), prior stroke, HIV (on antiretrovirals) was in [employer] parking lot with wife on 3/4/24 when he "started feeling off," patient's wife noticed and drove him to [facility], where in the parking lot patient had a witnessed seizure (patient supported and placed on ground by security); on awakening, patient was found to have Left sided paralysis, NIH 15, CT scan showed acute ischemic stroke with 2 large vessel occlusions, Right ICA Terminus Occlusion w/ Right Distal M1 Occlusion. Patient was given tenecteplase by [facility] at ~17:00 on 3/4/24, and was then transferred to [facility] for possible mechanical thrombectomy. Pt had mechanical thrombectomy 3/4/24 in Neuro IR, was found to have a Right Distal M1 Occlusion s/p TICI 3 Recanalization with 1 Aspiration pass.     Patient now with mobile LV thrombus 14mm x 9mm and aneurysmal apex, EF 50%. Started on heparin gtt.     Patient on antiretroviral therapy for HIV, endorses history of seizure disorder but denied any knowledge of prior stroke. Patient states he had his first seizure at age 26 and had workup done at a hospital in [place] including lumbar puncture. 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 Ron Chan, is on Keppra and Vimpat outpatient for management of seizures. Patient now notes that he was diagnosed with HIV prior this hospitalization and prior to his first stroke.      Multiple competing mechanisms in regards to etiology -- highly suspicious for cardioembolic source given LV thrombus however EF 50%. Suspicious for underlying additional hypercoagulable state/coagulopathy. Patient compliant on HAART (states his HIV is "undetectable" per ID) however possible contributory factor of HIV vasculopathy, less likely given no evidence of HIV CNS vasculopathy on imaging. Pending further workup.     NEURO:   -Neurologically with unchanged left homonymous hemianopsia, otherwise no focal neurologic deficits  -Patient complaining of intermittent numbness/parasthesias in left hand median nerve distribution; suspect peripheral nerve injury from multiple IV/blood draw attempts   -Accepted to transfer to inpatient stroke neurology service   -Continue close monitoring for neurologic deterioration    -Stroke neuro checks q 2 hours  -Permissive HTN, avoiding rapid fluctuations and hypotension -- patient asymptomatic in periodic hypotension, neurologically tolerating   -ANTITHROMBOTIC THERAPY: Heparin gtt, ptt goal 60-80, no bolus ; pending recommendations from cardiology regarding optimal agent for long term anticoagulation. Discussed with Pharmacist--Eliquis and Coumadin preferred given lower interactions with his HAART   -MR head as above   -LDL 70 -- no indication for statin therapy at this time  -Dysphagia screen: PASS  -Physical therapy/OT/Speech eval/treatment.   - Left hand numbness secondary to median nerve compression    CARDIOVASCULAR:  -TTE results as above, + LV thrombus  -Cardiology consult appreciated, pending CT angio chest to evaluate for CAD  -cardiac monitoring w/ telemetry for now, further evaluation pending findings of noted workup                              HEMATOLOGY:   -H/H 13.6/38.7, Platelets 216  -patient should have all age and risk appropriate malignancy screenings with PCP or sooner if clinically suspected   -Hypercoagulable work up thus far negative; f/u pending studies   -Pending CT C/A/P to r/o malignancy   -Testicular US completed, pending official radiology read   -LE duplex negative for DVT   -DVT ppx: Heparin s.c [] LMWH [] Hep gtt [x]     PULMONARY:   -protecting airway, saturating well     RENAL:   -BUN/Cr 11.7/0.96, monitor urine output, maintain adequate hydration    -Na Goal:  135-145    ID:   -afebrile, no leukocytosis, monitor for si/sx of infection   -RPR negative   -Pending HIV viral load count     OTHER:    -condition and plan of care d/w patient and NSICU team; questions and concerns addressed.     DISPOSITION:   -Rehab or home depending on PT eval once stable and workup is complete    OTHER:   -Condition and plan of care d/w patient and wife at bedside, questions and concerns addressed.     DISPOSITION:   -Rehab or home depending on PT eval once stable and workup is complete    CORE MEASURES:        Admission NIHSS: 14     Tenecteplase : [x] YES [] NO      LDL/A1C: 70/4.5     Depression Screen- if depression hx and/or present      Statin Therapy: Not indicated      Dysphagia Screen: [x] PASS [] FAIL     Smoking [] YES [x] NO      Afib [] YES [x] NO     Stroke Education [x] YES [] NO    Obtain screening lower extremity venous ultrasound in patients who meet 1 or more of the following criteria as patient is high risk for DVT/PE on admission:   [] History of DVT/PE  []Hypercoagulable states (Factor V Leiden, Cancer, OCP, etc. )  []Prolonged immobility (hemiplegia/hemiparesis/post operative or any other extended immobilization)  [] Transferred from outside facility (Rehab or Long term care)  [x] Age </= to 50

## 2024-03-07 NOTE — CHART NOTE - NSCHARTNOTESELECT_GEN_ALL_CORE
NSICU/Transfer Note
Neurointerventional Post Procedure Note/Event Note
Neurointerventional Pre Procedure Note/Event Note
Nutrition Services

## 2024-03-07 NOTE — PROGRESS NOTE ADULT - ASSESSMENT
34 y/o M with a PMHx of HIV, seizure disorder, and previous CVA who presented to [facility] with new onset left sided weakness c/b seizure in the parking lot, CT head showing acute CVA s/p TNK, transferred to [facility] for further management. Patient states that he was in his usual state of health until out shopping yesterday when he suddenly fell onto his left side and noticed he couldn't move his right arm or leg. Patient's girlfriend brought him to [facility] to be evaluated, but he had a witnessed fall with seizure in the parking lot. Patient had a CTA that showed right MCA occlusion s/p TNK and transferred to [facility] for a mechanical thrombectomy. Patient is now in neuro ICU with greatly improving neurological symptoms. Patient states he did not know he had a stroke previously, and it was never worked up. Patient denies any active fevers, chills, CP, SOB, abdominal pain, N/V/D.

## 2024-03-07 NOTE — PROGRESS NOTE ADULT - NS ATTEND AMEND GEN_ALL_CORE FT
cerebrovascular accident : LV apical aneurysm with sludge.   LVEF borderline 51% on cta.    No evidence of thrombus on CTA.  reviewed echo .  No echo evidence of organized thrombus. sludge and slow flow present.   Unclear cause of LV apical thrombus. No obvious non compaction seen. in any case, patient would benefit from anticoagulation .   Since there is no organized thrombuis.  we will consider NOAC as low bledeing risk and maintenance.    we will find insurance information and affordability to decide best oral anticoagulation therapy for this aptient.   will discuss PO anticoagulation tomorrow. ct IV heparin as tolerated if no bleeding risk  not on beta-blocker or ARb due ot low BP.

## 2024-03-07 NOTE — PROGRESS NOTE ADULT - SUBJECTIVE AND OBJECTIVE BOX
[facility]                                                         CARDIOLOGY AT [facility]                                                                  [address] [address]                                                         Telephone: [phone]. Fax:[phone]                                                                             PROGRESS NOTE    Reason for follow up: CVA, LV thrombus  Update: NSR no ectopy on tele. On Heparin GTT to assess tolerance and for bridge to coumadin for LV thrombus. Will also plan for CCTA today for further evaluation of coronary arteries       Review of symptoms:   Cardiac:  No chest pain. No dyspnea. No palpitations.  Respiratory: no cough. No dyspnea  Gastrointestinal: No diarrhea. No abdominal pain. No bleeding.   Neuro: No focal neuro complaints.    Vitals:  T(C): 36.5 (03-07-24 @ 08:13), Max: 37 (03-06-24 @ 12:02)  HR: 65 (03-07-24 @ 09:00) (52 - 83)  BP: 92/54 (03-07-24 @ 09:00) (89/73 - 124/94)  RR: 16 (03-07-24 @ 09:00) (11 - 26)  SpO2: 99% (03-07-24 @ 09:00) (94% - 100%)  Wt(kg): --  I&O's Summary    06 Mar 2024 07:01  -  07 Mar 2024 07:00  --------------------------------------------------------  IN: 4334 mL / OUT: 5720 mL / NET: -1386 mL    07 Mar 2024 07:01  -  07 Mar 2024 09:44  --------------------------------------------------------  IN: 0 mL / OUT: 750 mL / NET: -750 mL      Weight (kg): 74.1 (03-06 @ 01:32)    PHYSICAL EXAM:  Appearance: Comfortable. No acute distress  HEENT:  Atraumatic. Normocephalic.  Normal oral mucosa  Neurologic: A & O x 3, no gross focal deficits. some left arm numbness  Cardiovascular: RRR S1 S2, No murmur, no rubs/gallops. No JVD  Respiratory: Lungs clear to auscultation, unlabored   Gastrointestinal:  Soft, Non-tender, + BS  Lower Extremities: 2+ Peripheral Pulses, No clubbing, cyanosis, or edema  Psychiatry: Patient is calm. No agitation.   Skin: warm and dry.    CURRENT CARDIAC MEDICATIONS:      CURRENT OTHER MEDICATIONS:  darunavir 800 mG/cobicistat 150 mG/emtricitabine 200 mG/tenofovir alafenamide 10 mG (SYMTUZA) 1 Tablet(s) Oral daily  doravirine 100 milliGRAM(s) Oral daily  acetaminophen     Tablet .. 975 milliGRAM(s) Oral every 6 hours PRN Temp greater or equal to 38C (100.4F), Mild Pain (1 - 3)  acetaminophen   IVPB .. 1000 milliGRAM(s) IV Intermittent once  lacosamide 150 milliGRAM(s) Oral two times a day  levETIRAcetam 1000 milliGRAM(s) Oral daily  levETIRAcetam 1500 milliGRAM(s) Oral at bedtime  pantoprazole    Tablet 40 milliGRAM(s) Oral before breakfast  polyethylene glycol 3350 17 Gram(s) Oral daily  senna 2 Tablet(s) Oral at bedtime  benzocaine/menthol Lozenge 1 Lozenge Oral every 3 hours PRN Sore Throat  chlorhexidine 2% Cloths 1 Application(s) Topical <User Schedule>  heparin  Infusion 900 Unit(s)/Hr (9 mL/Hr) IV Continuous <Continuous>      LABS:	 	  ( 06 Mar 2024 15:39 )  Troponin T  X    ,  CPK  134  , CKMB  X    , BNP X                                  13.6   4.77  )-----------( 216      ( 07 Mar 2024 03:30 )             38.7     03-07    142  |  103  |  11.7  ----------------------------<  94  3.9   |  24.0  |  0.96    Ca    9.4      07 Mar 2024 03:30  Phos  3.8     03-07  Mg     2.2     03-07    TPro  7.1  /  Alb  4.0  /  TBili  0.3<L>  /  DBili  0.1  /  AST  17  /  ALT  10  /  AlkPhos  72  03-06    PT/INR/PTT ( 07 Mar 2024 03:30 )                       :                       :      X            :       57.1                  .        .                   .              .           .       X           .                                       Lipid Profile: Date: 03-06 @ 02:10  Total cholesterol 127; Direct LDL: --; HDL: 45; Triglycerides:62    HgA1c:   TSH: Thyroid Stimulating Hormone, Serum: 4.24 uIU/mL      TELEMETRY: NSR   ECG:    DIAGNOSTIC TESTING:  [ ] Echocardiogram:   < from: TTE W or WO Ultrasound Enhancing Agent (03.05.24 @ 13:19) >  TRANSTHORACIC ECHOCARDIOGRAM REPORT  ________________________________________________________________________________                                      _______       Pt. Name:       PRICILLA GEE Study Date:    3/5/2024  MRN:            [id number]             YOB: 1990  Accession #:    [id number]            Age:           33 years  Account#:       [id number]           Gender:        M  Heart Rate:     66 bpm               Height:        69.00 in (175.26 cm)  Rhythm:                       Weight:        187.00 lb (84.82 kg)  Blood Pressure: 87/61 mmHg           BSA/BMI:       2.01 m² / 27.61 kg/m²  ________________________________________________________________________________________  Referring Physician:    [id number] Porter Argueta  Interpreting Physician: Pablo Maldonado MD  Primary Sonographer:    Fiona Junior    CPT:                MYOCARDIAL STRAIN IMAGING - 62760.m;ECHO TTE WITH CON COMP W                      DOPP - .m;DEFINITY ECHO CONTRAST PER ML - .m  Indication(s):      Cerebral infarction, unspecified - I63.9  Procedure:          Transthoracic echocardiogram with 2-D, M-mode and complete                      spectral and color flow Doppler. Strain imaging performed            for evaluation of regional and global myocardial shape and                      dimensions.  Ordering Location:  [facility]  Admission Status:   Inpatient  Contrast Injection: Verbal consent was obtained for injection of Ultrasonic        Enhancing Agent following a discussion of risks and                      benefits.                      Endocardial visualization enhanced with 5 ml of Definity                      Ultrasound enhancing agent (Lot#:[id number] Exp.Date:01/2025).  UEA Reaction:       Patient had no adverse reaction after injection of                      Ultrasound Enhancing Agent.    _______________________________________________________________________________________     CONCLUSIONS:      1. Left ventricular systolic function is normal with an ejection fraction of 50 % by Henriquez's method of disks. Regional wall motion abnormalities present.   2. Entire apex is abnormal.   3. There is a mobile left ventricular thrombus located in the apex.   4. Mildly enlarged right ventricular cavity size and normal systolic function.   5. Agitated saline injection was negative for intracardiac shunt.   6. The interatrial septum appears intact.   7. No pericardial effusion seen.   8. No prior echocardiogram is available for comparison.    < end of copied text >  [ ]  Catheterization:  [ ] Stress Test:    OTHER:

## 2024-03-08 ENCOUNTER — TRANSCRIPTION ENCOUNTER (OUTPATIENT)
Age: 34
End: 2024-03-08

## 2024-03-08 VITALS
HEART RATE: 73 BPM | SYSTOLIC BLOOD PRESSURE: 97 MMHG | DIASTOLIC BLOOD PRESSURE: 78 MMHG | OXYGEN SATURATION: 100 % | RESPIRATION RATE: 20 BRPM

## 2024-03-08 LAB
APCR PPP: 2.69 RATIO — SIGNIFICANT CHANGE UP
APTT BLD: 70.1 SEC — HIGH (ref 24.5–35.6)
PROT S FREE PPP-ACNC: 83 % — SIGNIFICANT CHANGE UP (ref 63–140)

## 2024-03-08 PROCEDURE — C1760: CPT

## 2024-03-08 PROCEDURE — 83090 ASSAY OF HOMOCYSTEINE: CPT

## 2024-03-08 PROCEDURE — 86431 RHEUMATOID FACTOR QUANT: CPT

## 2024-03-08 PROCEDURE — 70450 CT HEAD/BRAIN W/O DYE: CPT | Mod: MC

## 2024-03-08 PROCEDURE — 86140 C-REACTIVE PROTEIN: CPT

## 2024-03-08 PROCEDURE — 99233 SBSQ HOSP IP/OBS HIGH 50: CPT

## 2024-03-08 PROCEDURE — 85027 COMPLETE CBC AUTOMATED: CPT

## 2024-03-08 PROCEDURE — 87640 STAPH A DNA AMP PROBE: CPT

## 2024-03-08 PROCEDURE — 85520 HEPARIN ASSAY: CPT

## 2024-03-08 PROCEDURE — 84484 ASSAY OF TROPONIN QUANT: CPT

## 2024-03-08 PROCEDURE — 75574 CT ANGIO HRT W/3D IMAGE: CPT | Mod: MC

## 2024-03-08 PROCEDURE — 74177 CT ABD & PELVIS W/CONTRAST: CPT | Mod: MC

## 2024-03-08 PROCEDURE — 76380 CAT SCAN FOLLOW-UP STUDY: CPT

## 2024-03-08 PROCEDURE — 86146 BETA-2 GLYCOPROTEIN ANTIBODY: CPT

## 2024-03-08 PROCEDURE — 85300 ANTITHROMBIN III ACTIVITY: CPT

## 2024-03-08 PROCEDURE — 71260 CT THORAX DX C+: CPT | Mod: MC

## 2024-03-08 PROCEDURE — 0225U NFCT DS DNA&RNA 21 SARSCOV2: CPT

## 2024-03-08 PROCEDURE — 86038 ANTINUCLEAR ANTIBODIES: CPT

## 2024-03-08 PROCEDURE — 71045 X-RAY EXAM CHEST 1 VIEW: CPT

## 2024-03-08 PROCEDURE — 82550 ASSAY OF CK (CPK): CPT

## 2024-03-08 PROCEDURE — 80076 HEPATIC FUNCTION PANEL: CPT

## 2024-03-08 PROCEDURE — 83036 HEMOGLOBIN GLYCOSYLATED A1C: CPT

## 2024-03-08 PROCEDURE — 85306 CLOT INHIBIT PROT S FREE: CPT

## 2024-03-08 PROCEDURE — 97163 PT EVAL HIGH COMPLEX 45 MIN: CPT

## 2024-03-08 PROCEDURE — 86225 DNA ANTIBODY NATIVE: CPT

## 2024-03-08 PROCEDURE — 84100 ASSAY OF PHOSPHORUS: CPT

## 2024-03-08 PROCEDURE — 61645 PERQ ART M-THROMBECT &/NFS: CPT

## 2024-03-08 PROCEDURE — 93356 MYOCRD STRAIN IMG SPCKL TRCK: CPT

## 2024-03-08 PROCEDURE — 97167 OT EVAL HIGH COMPLEX 60 MIN: CPT

## 2024-03-08 PROCEDURE — 76870 US EXAM SCROTUM: CPT

## 2024-03-08 PROCEDURE — 76377 3D RENDER W/INTRP POSTPROCES: CPT

## 2024-03-08 PROCEDURE — 85613 RUSSELL VIPER VENOM DILUTED: CPT

## 2024-03-08 PROCEDURE — 85362 FIBRIN DEGRADATION PRODUCTS: CPT

## 2024-03-08 PROCEDURE — 85303 CLOT INHIBIT PROT C ACTIVITY: CPT

## 2024-03-08 PROCEDURE — 84480 ASSAY TRIIODOTHYRONINE (T3): CPT

## 2024-03-08 PROCEDURE — 86147 CARDIOLIPIN ANTIBODY EA IG: CPT

## 2024-03-08 PROCEDURE — C8929: CPT

## 2024-03-08 PROCEDURE — 85025 COMPLETE CBC W/AUTO DIFF WBC: CPT

## 2024-03-08 PROCEDURE — C1894: CPT

## 2024-03-08 PROCEDURE — 80061 LIPID PANEL: CPT

## 2024-03-08 PROCEDURE — C1769: CPT

## 2024-03-08 PROCEDURE — 83880 ASSAY OF NATRIURETIC PEPTIDE: CPT

## 2024-03-08 PROCEDURE — 85610 PROTHROMBIN TIME: CPT

## 2024-03-08 PROCEDURE — 36415 COLL VENOUS BLD VENIPUNCTURE: CPT

## 2024-03-08 PROCEDURE — 85307 ASSAY ACTIVATED PROTEIN C: CPT

## 2024-03-08 PROCEDURE — 85652 RBC SED RATE AUTOMATED: CPT

## 2024-03-08 PROCEDURE — 81241 F5 GENE: CPT

## 2024-03-08 PROCEDURE — 84436 ASSAY OF TOTAL THYROXINE: CPT

## 2024-03-08 PROCEDURE — 83735 ASSAY OF MAGNESIUM: CPT

## 2024-03-08 PROCEDURE — 80048 BASIC METABOLIC PNL TOTAL CA: CPT

## 2024-03-08 PROCEDURE — 85730 THROMBOPLASTIN TIME PARTIAL: CPT

## 2024-03-08 PROCEDURE — 87641 MR-STAPH DNA AMP PROBE: CPT

## 2024-03-08 PROCEDURE — C9399: CPT

## 2024-03-08 PROCEDURE — 93970 EXTREMITY STUDY: CPT

## 2024-03-08 PROCEDURE — 86780 TREPONEMA PALLIDUM: CPT

## 2024-03-08 PROCEDURE — 84443 ASSAY THYROID STIM HORMONE: CPT

## 2024-03-08 PROCEDURE — 82728 ASSAY OF FERRITIN: CPT

## 2024-03-08 PROCEDURE — C1757: CPT

## 2024-03-08 PROCEDURE — 87536 HIV-1 QUANT&REVRSE TRNSCRPJ: CPT

## 2024-03-08 PROCEDURE — C1887: CPT

## 2024-03-08 PROCEDURE — 93005 ELECTROCARDIOGRAM TRACING: CPT

## 2024-03-08 PROCEDURE — 70553 MRI BRAIN STEM W/O & W/DYE: CPT | Mod: MC

## 2024-03-08 PROCEDURE — 85220 BLOOC CLOT FACTOR V TEST: CPT

## 2024-03-08 PROCEDURE — 81240 F2 GENE: CPT

## 2024-03-08 PROCEDURE — 82962 GLUCOSE BLOOD TEST: CPT

## 2024-03-08 RX ORDER — DORAVIRINE 100 MG/1
1 TABLET, FILM COATED ORAL
Refills: 0 | DISCHARGE

## 2024-03-08 RX ORDER — WARFARIN SODIUM 2.5 MG/1
1 TABLET ORAL
Qty: 5 | Refills: 3
Start: 2024-03-08

## 2024-03-08 RX ORDER — ENOXAPARIN SODIUM 100 MG/ML
70 INJECTION SUBCUTANEOUS
Qty: 10 | Refills: 0
Start: 2024-03-08

## 2024-03-08 RX ORDER — ERGOCALCIFEROL 1.25 MG/1
1 CAPSULE ORAL
Refills: 0 | DISCHARGE

## 2024-03-08 RX ORDER — ERGOCALCIFEROL 1.25 MG/1
1.25 CAPSULE ORAL
Qty: 0 | Refills: 0 | DISCHARGE
Start: 2024-03-08

## 2024-03-08 RX ORDER — DORAVIRINE 100 MG/1
1 TABLET, FILM COATED ORAL
Qty: 0 | Refills: 0 | DISCHARGE
Start: 2024-03-08

## 2024-03-08 RX ORDER — ENOXAPARIN SODIUM 100 MG/ML
70 INJECTION SUBCUTANEOUS ONCE
Refills: 0 | Status: COMPLETED | OUTPATIENT
Start: 2024-03-08 | End: 2024-03-08

## 2024-03-08 RX ORDER — ACETAMINOPHEN 500 MG
3 TABLET ORAL
Qty: 0 | Refills: 0 | DISCHARGE
Start: 2024-03-08

## 2024-03-08 RX ADMIN — LACOSAMIDE 150 MILLIGRAM(S): 50 TABLET ORAL at 17:15

## 2024-03-08 RX ADMIN — POLYETHYLENE GLYCOL 3350 17 GRAM(S): 17 POWDER, FOR SOLUTION ORAL at 11:37

## 2024-03-08 RX ADMIN — LEVETIRACETAM 1000 MILLIGRAM(S): 250 TABLET, FILM COATED ORAL at 09:27

## 2024-03-08 RX ADMIN — DORAVIRINE 100 MILLIGRAM(S): 100 TABLET, FILM COATED ORAL at 11:38

## 2024-03-08 RX ADMIN — LACOSAMIDE 150 MILLIGRAM(S): 50 TABLET ORAL at 06:11

## 2024-03-08 RX ADMIN — ENOXAPARIN SODIUM 70 MILLIGRAM(S): 100 INJECTION SUBCUTANEOUS at 17:15

## 2024-03-08 RX ADMIN — CHLORHEXIDINE GLUCONATE 1 APPLICATION(S): 213 SOLUTION TOPICAL at 06:12

## 2024-03-08 RX ADMIN — HEPARIN SODIUM 9 UNIT(S)/HR: 5000 INJECTION INTRAVENOUS; SUBCUTANEOUS at 06:12

## 2024-03-08 RX ADMIN — DARUNAVIR, COBICISTAT, EMTRICITABINE, AND TENOFOVIR ALAFENAMIDE 1 TABLET(S): 800; 150; 200; 10 TABLET, FILM COATED ORAL at 11:38

## 2024-03-08 NOTE — PROGRESS NOTE ADULT - PROVIDER SPECIALTY LIST ADULT
Cardiology
Intervent Radiology
NSICU
Rehab Medicine
NSICU
Neurology
Cardiology
Neurology
Neurology
NSICU
Cardiology

## 2024-03-08 NOTE — DISCHARGE NOTE PROVIDER - NSDCMRMEDTOKEN_GEN_ALL_CORE_FT
ergocalciferol 1.25 mg (50,000 intl units) oral capsule: 1 cap(s) orally once a week  Keppra 1000 mg oral tablet: orally 1000mg in the morning and 1500mg at night  Pifeltro 100 mg oral tablet: 1 tab(s) orally once a day (at bedtime)  Symtuza oral tablet: 1 tab(s) orally once a day (at bedtime)  Vimpat 150 mg oral tablet: 1 tab(s) orally 2 times a day   acetaminophen 325 mg oral tablet: 3 tab(s) orally every 6 hours As needed Temp greater or equal to 38C (100.4F), Mild Pain (1 - 3)  darunavir/cobicistat/emtricitabine/tenofovir 800 mg-150 mg-200 mg-10 mg oral tablet: 1 tab(s) orally once a day  doravirine 100 mg oral tablet: 1 tab(s) orally once a day  ergocalciferol 1.25 mg (50,000 intl units) oral capsule: 1 cap(s) orally once a week  Keppra 1000 mg oral tablet: orally 1000mg in the morning and 1500mg at night  Lovenox 80 mg/0.8 mL injectable solution: 70 milligram(s) subcutaneously  Symtuza oral tablet: 1 tab(s) orally once a day (at bedtime)  Vimpat 150 mg oral tablet: 1 tab(s) orally 2 times a day  warfarin 5 mg oral tablet: 1 tab(s) orally once a day   acetaminophen 325 mg oral tablet: 3 tab(s) orally every 6 hours As needed Temp greater or equal to 38C (100.4F), Mild Pain (1 - 3)  darunavir/cobicistat/emtricitabine/tenofovir 800 mg-150 mg-200 mg-10 mg oral tablet: 1 tab(s) orally once a day  doravirine 100 mg oral tablet: 1 tab(s) orally once a day  ergocalciferol 1.25 mg (50,000 intl units) oral capsule: 1.25 milligram(s) orally once a week  Keppra 1000 mg oral tablet: orally 1000mg in the morning and 1500mg at night  Lovenox 80 mg/0.8 mL injectable solution: 70 milligram(s) subcutaneously  Lovenox 80 mg/0.8 mL injectable solution: 70 milligram(s) subcutaneously  Symtuza oral tablet: 1 tab(s) orally once a day (at bedtime)  Vimpat 150 mg oral tablet: 1 tab(s) orally 2 times a day  warfarin 5 mg oral tablet: 1 tab(s) orally once a day  warfarin 5 mg oral tablet: 1 tab(s) orally once a day   acetaminophen 325 mg oral tablet: 3 tab(s) orally every 6 hours As needed Temp greater or equal to 38C (100.4F), Mild Pain (1 - 3)  darunavir/cobicistat/emtricitabine/tenofovir 800 mg-150 mg-200 mg-10 mg oral tablet: 1 tab(s) orally once a day  doravirine 100 mg oral tablet: 1 tab(s) orally once a day  ergocalciferol 1.25 mg (50,000 intl units) oral capsule: 1.25 milligram(s) orally once a week  Keppra 1000 mg oral tablet: orally 1000mg in the morning and 1500mg at night  Lovenox 80 mg/0.8 mL injectable solution: 70 milligram(s) subcutaneously  Symtuza oral tablet: 1 tab(s) orally once a day (at bedtime)  Vimpat 150 mg oral tablet: 1 tab(s) orally 2 times a day  warfarin 5 mg oral tablet: 1 tab(s) orally once a day

## 2024-03-08 NOTE — DISCHARGE NOTE PROVIDER - CARE PROVIDERS DIRECT ADDRESSES
[email or web address],DirectAddress_Unknown,DirectAddress_Unknown

## 2024-03-08 NOTE — DISCHARGE NOTE PROVIDER - CARE PROVIDER_API CALL
Ron Chan  Neurology  [address] [place] [id number]  Phone: [phone]  Fax: [phone]  Established Patient  Follow Up Time:     [address] [address]  Phone: [phone]  Fax: (   )    -  Established Patient  Scheduled Appointment: 03/12/2024 09:00 AM    Porter Argueta  Neurology  [address] [place] [id number]  Phone: [phone]  Fax: [phone]  Follow Up Time: 2 weeks

## 2024-03-08 NOTE — DISCHARGE NOTE PROVIDER - NSDCCPTREATMENT_GEN_ALL_CORE_FT
PRINCIPAL PROCEDURE  Procedure: Thrombectomy, artery, mechanical  Findings and Treatment:       SECONDARY PROCEDURE  Procedure: Transthoracic echocardiography (TTE)  Findings and Treatment:

## 2024-03-08 NOTE — PROGRESS NOTE ADULT - NS ATTEND AMEND GEN_ALL_CORE FT
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 No coronary artery disease . LV apical sludge.  with CVA  recommend anticoagulation . oral anticoagulation . Unclear cause of the aneurysm. Normal coronary arteries.  Unclear if MI due to vasospasm vs diverticulum. No further inpatient work up is needed.  May consider outpaitent cardiac MRI.   No further in-patient cardiac work-up/management is needed.  Follow-up in cardiology office in 2 weeks.

## 2024-03-08 NOTE — DISCHARGE NOTE PROVIDER - HOSPITAL COURSE
33 year old male PMHx of Seizure disorder (on Keppra and Vimpat), prior stroke, HIV (on antiretrovirals) was in [employer] parking lot with wife on 3/4/24 when he "started feeling off," patient's wife noticed and drove him to [facility], where in the parking lot patient had a witnessed seizure (patient supported and placed on ground by security); on awakening, patient was found to have Left sided paralysis, NIH 15, CT scan showed acute ischemic stroke with 2 large vessel occlusions, Right ICA Terminus Occlusion w/ Right Distal M1 Occlusion. Patient was given tenecteplase by [facility] at ~17:00 on 3/4/24, and was then transferred to [facility] for possible mechanical thrombectomy. Pt had mechanical thrombectomy 3/4/24 in Neuro IR, was found to have a Right Distal M1 Occlusion s/p TICI 3 Recanalization.      Patient on antiretroviral therapy for HIV, endorses history of seizure disorder but denied any knowledge of prior stroke. Patient states he had his first seizure at age 26 and had workup done at a hospital in [place] including lumbar puncture. 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 Ron Chan, is on Keppra and Vimpat outpatient for management of seizures. Patient now notes that he was diagnosed with HIV prior this hospitalization and prior to his first stroke.      A mobile LV thrombus 14mm x 9mm and aneurysmal apex, EF 50% was noted on TTE but a subsequent CT angio with contrast revealed no LV apical thrombus but abnormal motion of the LV. A 6 mm nodule in the right middle lobe     Multiple competing mechanisms in regards to etiology -- highly suspicious for cardioembolic source given LV thrombus however EF 50%. Suspicious for underlying additional hypercoagulable state/coagulopathy. Patient compliant on HAART (states his HIV is "undetectable" per ID) however possible contributory factor of HIV vasculopathy, less likely given no evidence of HIV CNS vasculopathy on imaging.    Discussed with pharmacist--only Eliquis 2.5mg BID or Coumadin are recommended.  Given that Eliquis low dose may not be as effective and levels cannot be monitored, Coumadin is likely a better and safer option--d/w Cardiology who agree. Patient will be bridged with lovenox with coumadin with INR 2-3.       CT Head No Cont (03.06.24 @ 11:34)   IMPRESSION:  1.  Redemonstrated discrimination of the right basal ganglia when   compared to the left, compatible with patient's recent MCA distribution   infarct. No evidence of betsy hemorrhagic transmission this time.  2.  Chronic ischemic changes as discussed above.    MR Head w/wo IV Cont (03.05.24 @ 16:41)   IMPRESSION:  Areas of acute ischemia within the right MCA territory with involvement   of the basal ganglia, posterior limb of the right internal capsule, and   right insula. No mass effect or associated blood products    US Duplex Venous Lower Ext Complete, Bilateral (03.05.24 @ 11:16)   IMPRESSION:  No evidence of deep venous thrombosis in either lower extremity.    CT Head No Cont (03.05.24 @ 05:48)   IMPRESSION:  No evidence of acute transcortical infarct, acute intracranial   hemorrhage, or mass effect.    CT Chest Abdomen and Pelvis w/ IV Cont (03.07.24 @ 12:17)   IMPRESSION:  No evidence of primary or metastatic malignancy in the chest, abdomen or   pelvis.    Nonspecific 5 mm right middle lobe nodule, which does not require follow   up per Fleischner Society guidelines.      TTE W or WO Ultrasound Enhancing Agent (03.05.24 @ 13:19)   CONCLUSIONS:   1. Left ventricular systolic function is normal with an ejection fraction of 50 % by Henriquez's method of disks. Regional wall motion abnormalities present.   2. Entire apex is abnormal.   3. There is a mobile left ventricular thrombus located in the apex.   4. Mildly enlarged right ventricular cavity size and normal systolic function.   5. Agitated saline injection was negative for intracardiac shunt.   6. The interatrial septum appears intact.   7. No pericardial effusion seen.   8. No prior echocardiogram is available for comparison.    CT Angio Cardiac w/ IV Cont (03.07.24 @ 12:16)     IMPRESSION:    Cardiac:  Coronary artery calcium score:0. 50th-75th percentile. No identifiable   coronary calcification.  No atherosclerosis or stenosis. 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 Abnormal wall motion in the LV apex. LVEF 51%.  No evidence of LV apical thrombus.  Prominent trabeculations in the LV.. No clear evidence of LV   non-compaction.    Non Cardiac:  6 mm nodule right middle lobe. Follow-up as per Fleischner Society   recommendations.           33 year old male PMHx of Seizure disorder (on Keppra and Vimpat), prior stroke, HIV (on antiretrovirals) was in [employer] parking lot with wife on 3/4/24 when he "started feeling off," patient's wife noticed and drove him to [facility], where in the parking lot patient had a witnessed seizure (patient supported and placed on ground by security); on awakening, patient was found to have Left sided paralysis, NIH 15, CT scan showed acute ischemic stroke with 2 large vessel occlusions, Right ICA Terminus Occlusion w/ Right Distal M1 Occlusion. Patient was given tenecteplase by [facility] at ~17:00 on 3/4/24, and was then transferred to [facility] for possible mechanical thrombectomy. Pt had mechanical thrombectomy 3/4/24 in Neuro IR, was found to have a Right Distal M1 Occlusion s/p TICI 3 Recanalization.      Patient on antiretroviral therapy for HIV, endorses history of seizure disorder but denied any knowledge of prior stroke. Patient states he had his first seizure at age 26 and had workup done at a hospital in [place] including lumbar puncture. 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 Ron Chan, is on Keppra and Vimpat outpatient for management of seizures. Patient now notes that he was diagnosed with HIV prior this hospitalization and prior to his first stroke.      A mobile LV thrombus 14mm x 9mm and aneurysmal apex, EF 50% was noted on TTE but a subsequent CT angio with contrast revealed no LV apical thrombus but abnormal motion of the LV. A 6 mm nodule in the right middle lobe     Multiple competing mechanisms in regards to etiology -- highly suspicious for cardioembolic source given LV thrombus however EF 50%. Suspicious for underlying additional hypercoagulable state/coagulopathy. Patient compliant on HAART (states his HIV is "undetectable" per ID) however possible contributory factor of HIV vasculopathy, less likely given no evidence of HIV CNS vasculopathy on imaging.    Discussed with pharmacist--only Eliquis 2.5mg BID or Coumadin are recommended.  Given that Eliquis low dose may not be as effective and levels cannot be monitored, Coumadin is likely a better and safer option--d/w Cardiology who agree. Patient will be started on Lovenox and bridged with Coumadin with INR 2-3. Discussed with JERSON Wilson at [facility] about the follow up with his INR checks - scheduled 03/12/24. Lovenox and anticoagulation education provided.         CT Head No Cont (03.06.24 @ 11:34)   IMPRESSION:  1.  Re-demonstrated discrimination of the right basal ganglia when   compared to the left, compatible with patient's recent MCA distribution   infarct. No evidence of betsy hemorrhagic transmission this time.  2.  Chronic ischemic changes as discussed above.    MR Head w/wo IV Cont (03.05.24 @ 16:41)   IMPRESSION:  Areas of acute ischemia within the right MCA territory with involvement   of the basal ganglia, posterior limb of the right internal capsule, and   right insula. No mass effect or associated blood products    US Duplex Venous Lower Ext Complete, Bilateral (03.05.24 @ 11:16)   IMPRESSION:  No evidence of deep venous thrombosis in either lower extremity.    CT Head No Cont (03.05.24 @ 05:48)   IMPRESSION:  No evidence of acute transcortical infarct, acute intracranial   hemorrhage, or mass effect.    CT Chest Abdomen and Pelvis w/ IV Cont (03.07.24 @ 12:17)   IMPRESSION:  No evidence of primary or metastatic malignancy in the chest, abdomen or   pelvis.    Nonspecific 5 mm right middle lobe nodule, which does not require follow   up per Fleischner Society guidelines.      TTE W or WO Ultrasound Enhancing Agent (03.05.24 @ 13:19)   CONCLUSIONS:   1. Left ventricular systolic function is normal with an ejection fraction of 50 % by Henriquez's method of disks. Regional wall motion abnormalities present.   2. Entire apex is abnormal.   3. There is a mobile left ventricular thrombus located in the apex.   4. Mildly enlarged right ventricular cavity size and normal systolic function.   5. Agitated saline injection was negative for intracardiac shunt.   6. The interatrial septum appears intact.   7. No pericardial effusion seen.   8. No prior echocardiogram is available for comparison.    CT Angio Cardiac w/ IV Cont (03.07.24 @ 12:16)     IMPRESSION:    Cardiac:  Coronary artery calcium score:0. 50th-75th percentile. No identifiable   coronary calcification.  No atherosclerosis or stenosis. 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 Abnormal wall motion in the LV apex. LVEF 51%.  No evidence of LV apical thrombus.  Prominent trabeculations in the LV.. No clear evidence of LV   non-compaction.    Non Cardiac:  6 mm nodule right middle lobe. Follow-up as per Fleischner Society   recommendations.

## 2024-03-08 NOTE — PROGRESS NOTE ADULT - PROBLEM SELECTOR PLAN 1
Acute R MCA occlusion s/p TNK and mechanical thrombectomy.   There is mobile LV thrombus in apex  As discussed with Stroke - considering discharge today if possible and asking for Rec's on AC.  Xarelto is contraindicated with HIV medications and Eliquis is recommended at 2.5mg po bid.    Will disuses with MD on rounds.    Continue heparin infusion full AC to assess AC tolerance for now  Transition to AC and possible discharge today as per Stroke PA.   Rec's pending  3/6 CT head no hemorrhagic transformation  LE venous duplex negative for DVT  CCTA with normal cors  Neuro following and will need outpatient follow up. Acute R MCA occlusion s/p TNK and mechanical thrombectomy.   There is mobile LV thrombus in apex  As discussed with Stroke - considering discharge today if possible and asking for Rec's on AC.  Xarelto is contraindicated with HIV medications and Eliquis is recommended at 2.5mg po bid.    Will disuses with MD on rounds.    Continue heparin infusion full AC to assess AC tolerance for now  Transition to AC and possible discharge today as per Stroke PA.   Rec's pending  3/6 CT head no hemorrhagic transformation  LE venous duplex negative for DVT  CCTA with normal cors  Neuro following and will need outpatient follow up.  Patient has an insurance issue and unable to go home on eliquis.  Will need to bridge to coumadin and follow up outpatient

## 2024-03-08 NOTE — DISCHARGE NOTE PROVIDER - PROVIDER TOKENS
PROVIDER:[TOKEN:[[id number]],ESTABLISHEDPATIENT:[T]],FREE:[LAST:[Louise Lemos],FIRST:[[place]],PHONE:[phone]],FAX:[(   [phone]    -],ADDRESS:[[address] [place] [address]],SCHEDULEDAPPT:[03/12/2024],SCHEDULEDAPPTTIME:[09:00 AM],ESTABLISHEDPATIENT:[T]],PROVIDER:[TOKEN:[[id number]],FOLLOWUP:[2 weeks]]

## 2024-03-08 NOTE — DISCHARGE NOTE PROVIDER - NSDCCPCAREPLAN_GEN_ALL_CORE_FT
PRINCIPAL DISCHARGE DIAGNOSIS  Diagnosis: Stroke  Assessment and Plan of Treatment: You were admitted to the hospital because you had a ischemic stroke/intracranial hemorrhage.   -Hypercoagulable state - please follow up with Infectious disease team at [facility]   -still undefined - will continue to be evaluated and monitored as an outpatient  For secondary stroke prevention please take your medications as prescribed. If you run low on your medication, please contact your doctor before you run out.  You will follow up outpatient with the vascular neurology team, the office will call you within 3 days of discharge to schedule an appointment.  If you do not hear from the office please call the phone number provided.    Please see all regularly scheduled providers as prior to your admission. Please see your primary care doctor within one week of discharge.  In addition discuss with your primary care provider regarding candidacy for routine malignancy screenings.   Adjustments to your regular tasks may be difficult after you've had a stroke, but you can utilize  new ways/assistance as needed to manage your daily activities based on the recommendations of your physical, occupational, speech and language team if applicable.    Call 911 if you or someone you know experiences the following symptoms of stroke (can be remembered by BE FAST):  •Balance: Dizziness, loss of balance, or a sense of falling  •Eyes: Sudden double vision, loss of vision, or blurred vision  •Face: drooping of one side of the face  •Arm: arm weakness or drifting  •Speech:  Sudden trouble talking or slurred speech, trouble understanding others  •Time: Time to call for an ambulance fast!

## 2024-03-08 NOTE — PROGRESS NOTE ADULT - SUBJECTIVE AND OBJECTIVE BOX
Preliminary note, official recommendations pending attending review/signature   Seen and examined by Stroke team attending/team, assessment/ plan as discussed with stroke team attending/team as noted.     [facility] Stroke Team  Progress Note     HPI:  33 year old male PMHx of Seizure disorder (on Keppra and Vimpat), prior stroke, HIV (on antiretrovirals) was in [employer] parking lot with wife on 3/4/24 when he "started feeling off," patient's wife noticed and drove him to [facility], where in the parking lot patient had a witnessed seizure (patient supported and placed on ground by security); on awakening, patient was found to have Left sided paralysis, NIH 15, CT scan showed acute ischemic stroke with 2 large vessel occlusions, Right ICA Terminus Occlusion w/ Right Distal M1 Occlusion. Patient was given tenecteplase by [facility] at ~17:00 on 3/4/24, and was then transferred to [facility] for possible mechanical thrombectomy. Pt had mechanical thrombectomy 3/4/24 in Neuro IR, was found to have a Right Distal M1 Occlusion s/p TICI 3 Recanalization with 1 Aspiration pass. Pt admitted to Neuro ICU for further monitoring, workup, and care. Stroke neurology team called to evaluate.     SUBJECTIVE: No events overnight.  No new neurologic complaints.  ROS reported negative unless otherwise noted.    acetaminophen     Tablet .. 975 milliGRAM(s) Oral every 6 hours PRN  acetaminophen   IVPB .. 1000 milliGRAM(s) IV Intermittent once  benzocaine/menthol Lozenge 1 Lozenge Oral every 3 hours PRN  chlorhexidine 2% Cloths 1 Application(s) Topical <User Schedule>  darunavir 800 mG/cobicistat 150 mG/emtricitabine 200 mG/tenofovir alafenamide 10 mG (SYMTUZA) 1 Tablet(s) Oral daily  doravirine 100 milliGRAM(s) Oral daily  heparin  Infusion 900 Unit(s)/Hr IV Continuous <Continuous>  lacosamide 150 milliGRAM(s) Oral two times a day  levETIRAcetam 1000 milliGRAM(s) Oral daily  levETIRAcetam 1500 milliGRAM(s) Oral at bedtime  polyethylene glycol 3350 17 Gram(s) Oral daily  senna 2 Tablet(s) Oral at bedtime      PHYSICAL EXAM:   Vital Signs Last 24 Hrs  T(C): 36.8 (08 Mar 2024 04:32), Max: 37 (07 Mar 2024 23:39)  T(F): 98.2 (08 Mar 2024 04:32), Max: 98.6 (07 Mar 2024 23:39)  HR: 65 (08 Mar 2024 07:00) (55 - 79)  BP: 101/69 (08 Mar 2024 07:00) (87/58 - 101/69)  BP(mean): 78 (08 Mar 2024 07:00) (64 - 84)  RR: 15 (08 Mar 2024 07:00) (12 - 22)  SpO2: 99% (08 Mar 2024 07:00) (94% - 100%)    Parameters below as of 07 Mar 2024 18:00  Patient On (Oxygen Delivery Method): room air      EXAM PENDING     General: No acute distress.   HEENT: Head normocephalic, atraumatic. Conjunctivae clear w/o exudates or hemorrhage. Sclera non-icteric. Nares are patent bilaterally. Mucous membranes pink and moist.  No tonsillar swelling or exudates.    Neck: Supple, no adenopathy. Trachea midline. No JVD.  Cardiac: Normal rate and rhythm. S1, S2 auscultated. No murmurs, gallops, or rubs.     Respiratory: Lung sounds clear in all fields. Chest wall symmetric, nontender.   Abdominal: Soft, nondistended, nontender. Bowel sounds normoactive x 4 quadrants. No masses, hepatomegaly, or splenomegaly.   Skin: Skin is warm, dry and intact without rashes or lesions. Appropriate color for ethnicity. Nailbeds pink with no cyanosis or clubbing.   Extremities: No edema, 2+ peripheral pulses in b/l upper and b/l lower extremities. Full range of motion in all joints.     General: No acute distress.     NEUROLOGICAL EXAM:  Mental status: Awake, alert, oriented x3, speech fluent, follows commands, no neglect, normal memory   Cranial Nerves: No facial asymmetry, no nystagmus, no dysarthria,  tongue midline; +Left homonymous hemianopsia   Motor exam: Normal tone, no drift, 5/5 RUE, 5/5 RLE, 5/5 LUE, 5/5 LLE  Sensation: Intact to light touch  Coordination/ Gait: No dysmetria, gait not tested    LABS:                        13.6   4.77  )-----------( 216      ( 07 Mar 2024 03:30 )             38.7    03-07    142  |  103  |  11.7  ----------------------------<  94  3.9   |  24.0  |  0.96    Ca    9.4      07 Mar 2024 03:30  Phos  3.8     03-07  Mg     2.2     03-07    PTT - ( 08 Mar 2024 02:35 )  PTT:70.1 sec        03-06 Chol 127 LDL=70 HDL 45 Trig 62    A1C: 4.5    ECHO:  TTE W or WO Ultrasound Enhancing Agent (03.05.24 @ 13:19)   CONCLUSIONS:   1. Left ventricular systolic function is normal with an ejection fraction of 50 % by Henriquez's method of disks. Regional wall motion abnormalities present.   2. Entire apex is abnormal.   3. There is a mobile left ventricular thrombus located in the apex.   4. Mildly enlarged right ventricular cavity size and normal systolic function.   5. Agitated saline injection was negative for intracardiac shunt.   6. The interatrial septum appears intact.   7. No pericardial effusion seen.   8. No prior echocardiogram is available for comparison.      IMAGING: Reviewed by me.     CT Head No Cont (03.06.24 @ 11:34)   IMPRESSION:  1.  Redemonstrated discrimination of the right basal ganglia when   compared to the left, compatible with patient's recent MCA distribution   infarct. No evidence of betsy hemorrhagic transmission this time.  2.  Chronic ischemic changes as discussed above.    MR Head w/wo IV Cont (03.05.24 @ 16:41)   IMPRESSION:  Areas of acute ischemia within the right MCA territory with involvement   of the basal ganglia, posterior limb of the right internal capsule, and   right insula. No mass effect or associated blood products    US Duplex Venous Lower Ext Complete, Bilateral (03.05.24 @ 11:16)   IMPRESSION:  No evidence of deep venous thrombosis in either lower extremity.    CT Head No Cont (03.05.24 @ 05:48)   IMPRESSION:  No evidence of acute transcortical infarct, acute intracranial   hemorrhage, or mass effect.         Preliminary note, official recommendations pending attending review/signature   Seen and examined by Stroke team attending/team, assessment/ plan as discussed with stroke team attending/team as noted.     [facility] Stroke Team  Progress Note     HPI:  33 year old male PMHx of Seizure disorder (on Keppra and Vimpat), prior stroke, HIV (on antiretrovirals) was in [employer] parking lot with wife on 3/4/24 when he "started feeling off," patient's wife noticed and drove him to [facility], where in the parking lot patient had a witnessed seizure (patient supported and placed on ground by security); on awakening, patient was found to have Left sided paralysis, NIH 15, CT scan showed acute ischemic stroke with 2 large vessel occlusions, Right ICA Terminus Occlusion w/ Right Distal M1 Occlusion. Patient was given tenect[facility]pl[facility] at ~17:00 on 3/4/24, and was then transferred to [facility] for possible mechanical thrombectomy. Pt had mechanical thrombectomy 3/4/24 in Neuro IR, was found to have a Right Distal M1 Occlusion s/p TICI 3 Recanalization with 1 Aspiration pass. Pt admitted to Neuro ICU for further monitoring, workup, and care. Stroke neurology team called to evaluate.     SUBJECTIVE: No events overnight.  No new neurologic complaints.  ROS reported negative unless otherwise noted.    acetaminophen     Tablet .. 975 milliGRAM(s) Oral every 6 hours PRN  acetaminophen   IVPB .. 1000 milliGRAM(s) IV Intermittent once  benzocaine/menthol Lozenge 1 Lozenge Oral every 3 hours PRN  chlorhexidine 2% Cloths 1 Application(s) Topical <User Schedule>  darunavir 800 mG/cobicistat 150 mG/emtricitabine 200 mG/tenofovir alafenamide 10 mG (SYMTUZA) 1 Tablet(s) Oral daily  doravirine 100 milliGRAM(s) Oral daily  heparin  Infusion 900 Unit(s)/Hr IV Continuous <Continuous>  lacosamide 150 milliGRAM(s) Oral two times a day  levETIRAcetam 1000 milliGRAM(s) Oral daily  levETIRAcetam 1500 milliGRAM(s) Oral at bedtime  polyethylene glycol 3350 17 Gram(s) Oral daily  senna 2 Tablet(s) Oral at bedtime      PHYSICAL EXAM:   Vital Signs Last 24 Hrs  T(C): 36.8 (08 Mar 2024 04:32), Max: 37 (07 Mar 2024 23:39)  T(F): 98.2 (08 Mar 2024 04:32), Max: 98.6 (07 Mar 2024 23:39)  HR: 65 (08 Mar 2024 07:00) (55 - 79)  BP: 101/69 (08 Mar 2024 07:00) (87/58 - 101/69)  BP(mean): 78 (08 Mar 2024 07:00) (64 - 84)  RR: 15 (08 Mar 2024 07:00) (12 - 22)  SpO2: 99% (08 Mar 2024 07:00) (94% - 100%)    Parameters below as of 07 Mar 2024 18:00  Patient On (Oxygen Delivery Method): room air      General: No acute distress. In bedside chair.   HEENT: Head normocephalic, atraumatic. Conjunctivae clear w/o exudates or hemorrhage. Sclera non-icteric. Nares are patent bilaterally. Mucous membranes pink and moist.  No tonsillar swelling or exudates.    Cardiac: Normal rate and rhythm.  Respiratory: Lung sounds clear in all fields. Chest wall symmetric, nontender. No labored respirations or use of accessory muscles.   Abdominal: Soft, nondistended, nontender. Bowel sounds normoactive x 4 quadrants.    Skin: Skin is warm, dry and intact without rashes or lesions.   Extremities: No edema, Full range of motion in all joints.     General: No acute distress.     NEUROLOGICAL EXAM:  Mental status: Awake, alert, oriented x3, speech fluent, follows commands, no neglect, normal memory   Cranial Nerves: No facial asymmetry, no nystagmus, no dysarthria,  tongue midline; +Left homonymous hemianopsia   Motor exam: Normal tone, no drift, 5/5 RUE, 5/5 RLE, 5/5 LUE, 5/5 LLE  Sensation: Intact to light touch  Coordination/ Gait: No dysmetria, gait not tested    LABS:                        13.6   4.77  )-----------( 216      ( 07 Mar 2024 03:30 )             38.7    03-07    142  |  103  |  11.7  ----------------------------<  94  3.9   |  24.0  |  0.96    Ca    9.4      07 Mar 2024 03:30  Phos  3.8     03-07  Mg     2.2     03-07    PTT - ( 08 Mar 2024 02:35 )  PTT:70.1 sec        03-06 Chol 127 LDL=70 HDL 45 Trig 62    A1C: 4.5    ECHO:  TTE W or WO Ultrasound Enhancing Agent (03.05.24 @ 13:19)   CONCLUSIONS:   1. Left ventricular systolic function is normal with an ejection fraction of 50 % by Henriquez's method of disks. Regional wall motion abnormalities present.   2. Entire apex is abnormal.   3. There is a mobile left ventricular thrombus located in the apex.   4. Mildly enlarged right ventricular cavity size and normal systolic function.   5. Agitated saline injection was negative for intracardiac shunt.   6. The interatrial septum appears intact.   7. No pericardial effusion seen.   8. No prior echocardiogram is available for comparison.      IMAGING: Reviewed by me.     CT Head No Cont (03.06.24 @ 11:34)   IMPRESSION:  1.  Redemonstrated discrimination of the right basal ganglia when   compared to the left, compatible with patient's recent MCA distribution   infarct. No evidence of betsy hemorrhagic transmission this time.  2.  Chronic ischemic changes as discussed above.    MR Head w/wo IV Cont (03.05.24 @ 16:41)   IMPRESSION:  Areas of acute ischemia within the right MCA territory with involvement   of the basal ganglia, posterior limb of the right internal capsule, and   right insula. No mass effect or associated blood products    US Duplex Venous Lower Ext Complete, Bilateral (03.05.24 @ 11:16)   IMPRESSION:  No evidence of deep venous thrombosis in either lower extremity.    CT Head No Cont (03.05.24 @ 05:48)   IMPRESSION:  No evidence of acute transcortical infarct, acute intracranial   hemorrhage, or mass effect.    CT Chest Abdomen and Pelvis w/ IV Cont (03.07.24 @ 12:17)   IMPRESSION:  No evidence of primary or metastatic malignancy in the chest, abdomen or   pelvis.    Nonspecific 5 mm right middle lobe nodule, which does not require follow   up per Fleischner Society guidelines.                [facility] Stroke Team  Progress Note     HPI:  33 year old male PMHx of Seizure disorder (on Keppra and Vimpat), prior stroke, HIV (on antiretrovirals) was in [employer] parking lot with wife on 3/4/24 when he "started feeling off," patient's wife noticed and drove him to [facility], where in the parking lot patient had a witnessed seizure (patient supported and placed on ground by security); on awakening, patient was found to have Left sided paralysis, NIH 15, CT scan showed acute ischemic stroke with 2 large vessel occlusions, Right ICA Terminus Occlusion w/ Right Distal M1 Occlusion. Patient was given tenecteplase by [facility] at ~17:00 on 3/4/24, and was then transferred to [facility] for possible mechanical thrombectomy. Pt had mechanical thrombectomy 3/4/24 in Neuro IR, was found to have a Right Distal M1 Occlusion s/p TICI 3 Recanalization with 1 Aspiration pass. Pt admitted to Neuro ICU for further monitoring, workup, and care. Stroke neurology team called to evaluate.     SUBJECTIVE: No events overnight.  No new neurologic complaints.  ROS reported negative unless otherwise noted.    acetaminophen     Tablet .. 975 milliGRAM(s) Oral every 6 hours PRN  acetaminophen   IVPB .. 1000 milliGRAM(s) IV Intermittent once  benzocaine/menthol Lozenge 1 Lozenge Oral every 3 hours PRN  chlorhexidine 2% Cloths 1 Application(s) Topical <User Schedule>  darunavir 800 mG/cobicistat 150 mG/emtricitabine 200 mG/tenofovir alafenamide 10 mG (SYMTUZA) 1 Tablet(s) Oral daily  doravirine 100 milliGRAM(s) Oral daily  heparin  Infusion 900 Unit(s)/Hr IV Continuous <Continuous>  lacosamide 150 milliGRAM(s) Oral two times a day  levETIRAcetam 1000 milliGRAM(s) Oral daily  levETIRAcetam 1500 milliGRAM(s) Oral at bedtime  polyethylene glycol 3350 17 Gram(s) Oral daily  senna 2 Tablet(s) Oral at bedtime      PHYSICAL EXAM:   Vital Signs Last 24 Hrs  T(C): 36.8 (08 Mar 2024 04:32), Max: 37 (07 Mar 2024 23:39)  T(F): 98.2 (08 Mar 2024 04:32), Max: 98.6 (07 Mar 2024 23:39)  HR: 65 (08 Mar 2024 07:00) (55 - 79)  BP: 101/69 (08 Mar 2024 07:00) (87/58 - 101/69)  BP(mean): 78 (08 Mar 2024 07:00) (64 - 84)  RR: 15 (08 Mar 2024 07:00) (12 - 22)  SpO2: 99% (08 Mar 2024 07:00) (94% - 100%)    Parameters below as of 07 Mar 2024 18:00  Patient On (Oxygen Delivery Method): room air      General: No acute distress. In bedside chair.   HEENT: Head normocephalic, atraumatic. Conjunctivae clear w/o exudates or hemorrhage. Sclera non-icteric. Nares are patent bilaterally. Mucous membranes pink and moist.  No tonsillar swelling or exudates.    Cardiac: Normal rate and rhythm.  Respiratory: Lung sounds clear in all fields. Chest wall symmetric, nontender. No labored respirations or use of accessory muscles.   Abdominal: Soft, nondistended, nontender. Bowel sounds normoactive x 4 quadrants.    Skin: Skin is warm, dry and intact without rashes or lesions.   Extremities: No edema, Full range of motion in all joints.     General: No acute distress.     NEUROLOGICAL EXAM:  Mental status: Awake, alert, oriented x3, speech fluent, follows commands, no neglect, normal memory   Cranial Nerves: No facial asymmetry, no nystagmus, no dysarthria,  tongue midline; +Left homonymous hemianopsia   Motor exam: Normal tone, no drift, 5/5 RUE, 5/5 RLE, 5/5 LUE, 5/5 LLE  Sensation: Intact to light touch  Coordination/ Gait: No dysmetria, gait not tested    LABS:                        13.6   4.77  )-----------( 216      ( 07 Mar 2024 03:30 )             38.7    03-07    142  |  103  |  11.7  ----------------------------<  94  3.9   |  24.0  |  0.96    Ca    9.4      07 Mar 2024 03:30  Phos  3.8     03-07  Mg     2.2     03-07    PTT - ( 08 Mar 2024 02:35 )  PTT:70.1 sec        03-06 Chol 127 LDL=70 HDL 45 Trig 62    A1C: 4.5    ECHO:  TTE W or WO Ultrasound Enhancing Agent (03.05.24 @ 13:19)   CONCLUSIONS:   1. Left ventricular systolic function is normal with an ejection fraction of 50 % by Henriquez's method of disks. Regional wall motion abnormalities present.   2. Entire apex is abnormal.   3. There is a mobile left ventricular thrombus located in the apex.   4. Mildly enlarged right ventricular cavity size and normal systolic function.   5. Agitated saline injection was negative for intracardiac shunt.   6. The interatrial septum appears intact.   7. No pericardial effusion seen.   8. No prior echocardiogram is available for comparison.      IMAGING: Reviewed by me.     CT Head No Cont (03.06.24 @ 11:34)   IMPRESSION:  1.  Redemonstrated discrimination of the right basal ganglia when   compared to the left, compatible with patient's recent MCA distribution   infarct. No evidence of betsy hemorrhagic transmission this time.  2.  Chronic ischemic changes as discussed above.    MR Head w/wo IV Cont (03.05.24 @ 16:41)   IMPRESSION:  Areas of acute ischemia within the right MCA territory with involvement   of the basal ganglia, posterior limb of the right internal capsule, and   right insula. No mass effect or associated blood products    US Duplex Venous Lower Ext Complete, Bilateral (03.05.24 @ 11:16)   IMPRESSION:  No evidence of deep venous thrombosis in either lower extremity.    CT Head No Cont (03.05.24 @ 05:48)   IMPRESSION:  No evidence of acute transcortical infarct, acute intracranial   hemorrhage, or mass effect.    CT Chest Abdomen and Pelvis w/ IV Cont (03.07.24 @ 12:17)   IMPRESSION:  No evidence of primary or metastatic malignancy in the chest, abdomen or   pelvis.    Nonspecific 5 mm right middle lobe nodule, which does not require follow   up per Fleischner Society guidelines.

## 2024-03-08 NOTE — PROGRESS NOTE ADULT - SUBJECTIVE AND OBJECTIVE BOX
[facility]                                                         CARDIOLOGY AT [facility]                                                                  [address], [place] [address]                                                         Telephone: [phone]. Fax:[phone]                                                                             PROGRESS NOTE    Reason for follow up:   CVA/LV thrombus  Update:   Sitting up feeling well.   As discussed with Stroke - considering discharge today if possible and asking for Rec's on AC.  Xarelto is contraindicated with HIV medications and Eliquis is recommended at 2.5mg po bid.    Will disuses with MD on rounds.      Review of symptoms:   Cardiac:  No chest pain. No dyspnea. No palpitations.  Respiratory: no cough. No dyspnea  Gastrointestinal: No diarrhea. No abdominal pain. No bleeding.   Neuro: No focal neuro complaints.    Vitals:  T(C): 36.7 (03-08-24 @ 08:12), Max: 37 (03-07-24 @ 23:39)  HR: 71 (03-08-24 @ 08:00) (55 - 79)  BP: 101/63 (03-08-24 @ 08:00) (87/58 - 101/69)  RR: 18 (03-08-24 @ 08:00) (12 - 22)  SpO2: 98% (03-08-24 @ 08:00) (94% - 100%)  Wt(kg): --  I&O's Summary    07 Mar 2024 07:01  -  08 Mar 2024 07:00  --------------------------------------------------------  IN: 198 mL / OUT: 3150 mL / NET: -2952 mL  Weight (kg): 74.1 (03-06 @ 01:32)    PHYSICAL EXAM:  Appearance: Comfortable. No acute distress  HEENT:  Atraumatic. Normocephalic.  Normal oral mucosa  Neurologic: A & O x 3, no gross focal deficits.  Cardiovascular: RRR S1 S2, No murmur, no rubs/gallops. No JVD  Respiratory: Lungs clear to auscultation, unlabored   Gastrointestinal:  Soft, Non-tender, + BS  Lower Extremities: 2+ Peripheral Pulses, No clubbing, cyanosis, or edema  Psychiatry: Patient is calm. No agitation.   Skin: warm and dry.    CURRENT CARDIAC MEDICATIONS:    CURRENT OTHER MEDICATIONS:  darunavir 800 mG/cobicistat 150 mG/emtricitabine 200 mG/tenofovir alafenamide 10 mG (SYMTUZA) 1 Tablet(s) Oral daily  doravirine 100 milliGRAM(s) Oral daily  acetaminophen     Tablet .. 975 milliGRAM(s) Oral every 6 hours PRN Temp greater or equal to 38C (100.4F), Mild Pain (1 - 3)  acetaminophen   IVPB .. 1000 milliGRAM(s) IV Intermittent once  lacosamide 150 milliGRAM(s) Oral two times a day  levETIRAcetam 1000 milliGRAM(s) Oral daily  levETIRAcetam 1500 milliGRAM(s) Oral at bedtime  polyethylene glycol 3350 17 Gram(s) Oral daily  senna 2 Tablet(s) Oral at bedtime  benzocaine/menthol Lozenge 1 Lozenge Oral every 3 hours PRN Sore Throat  chlorhexidine 2% Cloths 1 Application(s) Topical <User Schedule>  heparin  Infusion 900 Unit(s)/Hr (9 mL/Hr) IV Continuous <Continuous>    LABS:	 	  ( 06 Mar 2024 15:39 )  Troponin T  X    ,  CPK  134  , CKMB  X    , BNP X                              13.6   4.77  )-----------( 216      ( 07 Mar 2024 03:30 )             38.7     03-07    142  |  103  |  11.7  ----------------------------<  94  3.9   |  24.0  |  0.96    Ca    9.4      07 Mar 2024 03:30  Phos  3.8     03-07  Mg     2.2     03-07      PT/INR/PTT ( 08 Mar 2024 02:35 )                       :                       :      X            :       70.1                  .        .                   .              .           .       X           .                                       Lipid Profile: Date: 03-06 @ 02:10  Total cholesterol 127; Direct LDL: --; HDL: 45; Triglycerides:62    TSH: Thyroid Stimulating Hormone, Serum: 4.24 uIU/mL    TELEMETRY:   Reviewed

## 2024-03-08 NOTE — PROGRESS NOTE ADULT - NS ATTEND AMEND GEN_ALL_CORE FT
As above As above.  Attempted to call his neurologist Dr. Ron Chan at [facility]--called multiple times, no answer.

## 2024-03-08 NOTE — PROGRESS NOTE ADULT - ASSESSMENT
32 y/o M with a PMHx of HIV, seizure disorder, and previous CVA who presented to [facility] with new onset left sided weakness c/b seizure in the parking lot, CT head showing acute CVA s/p TNK, transferred to [facility] for further management. Patient states that he was in his usual state of health until out shopping yesterday when he suddenly fell onto his left side and noticed he couldn't move his right arm or leg. Patient's girlfriend brought him to [facility] to be evaluated, but he had a witnessed fall with seizure in the parking lot. Patient had a CTA that showed right MCA occlusion s/p TNK and transferred to [facility] for a mechanical thrombectomy. Patient is now in neuro ICU with greatly improving neurological symptoms. Patient states he did not know he had a stroke previously, and it was never worked up. Patient denies any active fevers, chills, CP, SOB, abdominal pain, N/V/D.    32 y/o M with a PMHx of HIV, seizure disorder, and previous CVA who [place] to [facility] with new onset left sided weakness c/b seizure in the parking lot, CT head showing acute CVA s/p TNK, transferred to [facility] for further management. Patient states that he was in his usual state of health until out shopping yesterday when he suddenly fell onto his left side and noticed he couldn't move his right arm or leg. Patient's girlfriend brought him to [facility] to be evaluated, but he had a witnessed fall with seizure in the parking lot. Patient had a CTA that showed right MCA occlusion s/p TNK and transferred to [facility] for a mechanical thrombectomy. Patient is now in neuro ICU with greatly improving neurological symptoms. Patient states he did not know he had a stroke previously, and it was never worked up. Patient denies any active fevers, chills, CP, SOB, abdominal pain, N/V/D.         DISPO:  Will need to bridge coumadin with heparin/lovenox in preparation with discharge.  INR goal 2-3.  Will sign off and reconsult with any issues.

## 2024-03-08 NOTE — PROGRESS NOTE ADULT - PROBLEM SELECTOR PLAN 2
There is mobile LV thrombus in apex  As discussed with Stroke - considering discharge today if possible and asking for Rec's on AC.  Xarelto is contraindicated with HIV medications and Eliquis is recommended at 2.5mg po bid.    Will disuses with MD on rounds.    Continue heparin infusion full AC to assess AC tolerance  Eventual transition to AC and possible discharge today as per Stroke PA.    LE venous duplex negative for DVT  CCTA with normal cors  Not on beta-blocker or ARb due ot low BP.

## 2024-03-08 NOTE — PROGRESS NOTE ADULT - ASSESSMENT
INCOMPLETE     ASSESSMENT:   33 year old male PMHx of Seizure disorder (on Keppra and Vimpat), prior stroke, HIV (on antiretrovirals) was in [employer] parking lot with wife on 3/4/24 when he "started feeling off," patient's wife noticed and drove him to [facility], where in the parking lot patient had a witnessed seizure (patient supported and placed on ground by security); on awakening, patient was found to have Left sided paralysis, NIH 15, CT scan showed acute ischemic stroke with 2 large vessel occlusions, Right ICA Terminus Occlusion w/ Right Distal M1 Occlusion. Patient was given tenecteplase by [facility] at ~17:00 on 3/4/24, and was then transferred to [facility] for possible mechanical thrombectomy. Pt had mechanical thrombectomy 3/4/24 in Neuro IR, was found to have a Right Distal M1 Occlusion s/p TICI 3 Recanalization with 1 Aspiration pass.     Patient now with mobile LV thrombus 14mm x 9mm and aneurysmal apex, EF 50%. Started on heparin gtt.     Patient on antiretroviral therapy for HIV, endorses history of seizure disorder but denied any knowledge of prior stroke. Patient states he had his first seizure at age 26 and had workup done at a hospital in [place] including lumbar puncture. 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 Ron Chan, is on Keppra and Vimpat outpatient for management of seizures. Patient now notes that he was diagnosed with HIV prior this hospitalization and prior to his first stroke.      Multiple competing mechanisms in regards to etiology -- highly suspicious for cardioembolic source given LV thrombus however EF 50%. Suspicious for underlying additional hypercoagulable state/coagulopathy. Patient compliant on HAART (states his HIV is "undetectable" per ID) however possible contributory factor of HIV vasculopathy, less likely given no evidence of HIV CNS vasculopathy on imaging. Pending further workup.     NEURO:   -Neurologically with unchanged left homonymous hemianopsia, otherwise no focal neurologic deficits  -Patient complaining of intermittent numbness/parasthesias in left hand median nerve distribution; suspect peripheral nerve injury from multiple IV/blood draw attempts   -Continue close monitoring for neurologic deterioration    -Stroke neuro checks q 2 hours  -Permissive HTN, avoiding rapid fluctuations and hypotension -- patient asymptomatic in periodic hypotension, neurologically tolerating   -ANTITHROMBOTIC THERAPY: Heparin gtt, ptt goal 60-80, no bolus ; pending recommendations from cardiology regarding optimal agent for long term anticoagulation. Discussed with Pharmacist--Eliquis and Coumadin preferred given lower interactions with his HAART   -MR head as above   -LDL 70 -- no indication for statin therapy at this time  -Dysphagia screen: PASS  -Physical therapy/OT/Speech eval/treatment.   - Left hand numbness secondary to median nerve compression    CARDIOVASCULAR:  -TTE results as above, + LV thrombus  -Cardiology consult appreciated, pending CT angio chest to evaluate for CAD  -cardiac monitoring w/ telemetry for now, further evaluation pending findings of noted workup                              HEMATOLOGY:   -H/H    , Platelets     -patient should have all age and risk appropriate malignancy screenings with PCP or sooner if clinically suspected   -Hypercoagulable work up thus far negative; f/u pending studies   - CT C/A/P to r/o malignancy without obvious evidence of malignancy: 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 Patent central airways. 5 mm nodule in the right  middle lobe (3/72). No evidence of primary or metastatic malignancy in the chest, abdomen or   pelvis.  -Testicular US completed -No evidence of testicular mass.  -LE duplex negative for DVT   -DVT ppx: Heparin s.c [] LMWH [] Hep gtt [x]     PULMONARY:   -protecting airway, saturating well     RENAL:   -BUN/Cr 11.7/0.96, monitor urine output, maintain adequate hydration    -Na Goal:  135-145    ID:   -afebrile, no leukocytosis, monitor for si/sx of infection   -RPR negative   -Pending HIV viral load count     OTHER:    -condition and plan of care d/w patient and NSICU team; questions and concerns addressed.     DISPOSITION:   -Rehab or home depending on PT eval once stable and workup is complete    CORE MEASURES:        Admission NIHSS: 14     Tenecteplase : [x] YES [] NO      LDL/A1C: 70/4.5     Depression Screen- if depression hx and/or present      Statin Therapy: Not indicated      Dysphagia Screen: [x] PASS [] FAIL     Smoking [] YES [x] NO      Afib [] YES [x] NO     Stroke Education [x] YES [] NO    Obtain screening lower extremity venous ultrasound in patients who meet 1 or more of the following criteria as patient is high risk for DVT/PE on admission:   [] History of DVT/PE  []Hypercoagulable states (Factor V Leiden, Cancer, OCP, etc. )  []Prolonged immobility (hemiplegia/hemiparesis/post operative or any other extended immobilization)  [] Transferred from outside facility (Rehab or Long term care)  [x] Age </= to 50     ASSESSMENT:   33 year old male PMHx of Seizure disorder (on Keppra and Vimpat), prior stroke, HIV (on antiretrovirals) was in [employer] parking lot with wife on 3/4/24 when he "started feeling off," patient's wife noticed and drove him to [facility], where in the parking lot patient had a witnessed seizure (patient supported and placed on ground by security); on awakening, patient was found to have Left sided paralysis, NIH 15, CT scan showed acute ischemic stroke with 2 large vessel occlusions, Right ICA Terminus Occlusion w/ Right Distal M1 Occlusion. Patient was given tenecteplase by [facility] at ~17:00 on 3/4/24, and was then transferred to [facility] for possible mechanical thrombectomy. Pt had mechanical thrombectomy 3/4/24 in Neuro IR, was found to have a Right Distal M1 Occlusion s/p TICI 3 Recanalization.      Patient on antiretroviral therapy for HIV, endorses history of seizure disorder but denied any knowledge of prior stroke. Patient states he had his first seizure at age 26 and had workup done at a hospital in [place] including lumbar puncture. 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 Ron Chan, is on Keppra and Vimpat outpatient for management of seizures. Patient now notes that he was diagnosed with HIV prior this hospitalization and prior to his first stroke.      Patient now with mobile LV thrombus 14mm x 9mm and aneurysmal apex, EF 50%.     Multiple competing mechanisms in regards to etiology -- highly suspicious for cardioembolic source given LV thrombus however EF 50%. Suspicious for underlying additional hypercoagulable state/coagulopathy. Patient compliant on HAART (states his HIV is "undetectable" per ID) however possible contributory factor of HIV vasculopathy, less likely given no evidence of HIV CNS vasculopathy on imaging. Pending further workup.     NEURO:   -Neurologically with unchanged left homonymous hemianopsia, otherwise no focal neurologic deficits   -Continue close monitoring for neurologic deterioration    -Stroke neuro checks q 2 hours, neurologically tolerating heparin gtt, will transition to q4 stroke neuro checks and vital signs.   -3T telemetry   -SBP 80-100mmHg, neurologically stable, HD stable and tolerating- asymptomatic at this time.   -ANTITHROMBOTIC THERAPY: Heparin gtt, ptt goal 60-80, no bolus ; pending recommendations from cardiology regarding optimal agent for long term anticoagulation. Discussed with Pharmacist--Eliquis and Coumadin preferred given lower interactions with his HAART but will require reduced dose Apixaban. Discussed with cardiology team to ensure no further preference in this setting. Eventual FD lovenox bridge to warfarin with INR goal 2-3 vs. Apixaban 2.5mg BID.    -MR head as above   -LDL 70 -- no indication for statin therapy at this time  -Dysphagia screen: PASS  -Physical therapy/OT/Speech eval/treatment.   - Left hand numbness secondary to median nerve compression    CARDIOVASCULAR:  -TTE results as above, + LV thrombus  -Cardiology consult appreciated, noted CCTA w/ normal cors.   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 Patent central airways. 5 mm nodule in the right  middle lobe (3/72). No evidence of primary or metastatic malignancy in the chest, abdomen or pelvis. This needs to be followed up closely w/ pcp- appropriate specialists to ensure not clinically significant and/or disease progression.   -Testicular US completed -No evidence of testicular mass.  -LE duplex negative for DVT   -DVT ppx: Hep gtt [x]     PULMONARY:   -protecting airway, saturating well     RENAL:   -BUN/Cr 11.7/0.96, monitor urine output, maintain adequate hydration    -Na Goal:  135-145    ID:   -afebrile, no leukocytosis, monitor for si/sx of infection   -RPR negative   -Pending HIV viral load count     OTHER:    -condition and plan of care d/w patient and RN; questions and concerns addressed.     DISPOSITION:   -home per PT/OT, patient to speak with his  as there is a noted discrepancy in his insurance policy. Attempting to obtain pcp, will need neuro, ID and heme/onc follow up.  Endocrine as well to ensure TSH trends appropriately slightly elevated but possibly reactive in acute setting.     CORE MEASURES:        Admission NIHSS: 14     Tenecteplase : [x] YES [] NO      LDL/A1C: 70/4.5     Depression Screen- if depression hx and/or present      Statin Therapy: Not indicated      Dysphagia Screen: [x] PASS [] FAIL     Smoking [] YES [x] NO      Afib [] YES [x] NO     Stroke Education [x] YES [] NO    Obtain screening lower extremity venous ultrasound in patients who meet 1 or more of the following criteria as patient is high risk for DVT/PE on admission:   [] History of DVT/PE  []Hypercoagulable states (Factor V Leiden, Cancer, OCP, etc. )  []Prolonged immobility (hemiplegia/hemiparesis/post operative or any other extended immobilization)  [] Transferred from outside facility (Rehab or Long term care)  [x] Age </= to 50     ASSESSMENT:   33 year old male PMHx of Seizure disorder (on Keppra and Vimpat), prior stroke, HIV (on antiretrovirals) was in [employer] parking lot with wife on 3/4/24 when he "started feeling off," patient's wife noticed and drove him to [facility], where in the parking lot patient had a witnessed seizure (patient supported and placed on ground by security); on awakening, patient was found to have Left sided paralysis, NIH 15, CT scan showed acute ischemic stroke with 2 large vessel occlusions, Right ICA Terminus Occlusion w/ Right Distal M1 Occlusion. Patient was given tenecteplase by [facility] at ~17:00 on 3/4/24, and was then transferred to [facility] for possible mechanical thrombectomy. Pt had mechanical thrombectomy 3/4/24 in Neuro IR, was found to have a Right Distal M1 Occlusion s/p TICI 3 Recanalization.      Patient on antiretroviral therapy for HIV, endorses history of seizure disorder but denied any knowledge of prior stroke. Patient states he had his first seizure at age 26 and had workup done at a hospital in [place] including lumbar puncture. 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 Ron Chan, is on Keppra and Vimpat outpatient for management of seizures. Patient now notes that he was diagnosed with HIV prior this hospitalization and prior to his first stroke.      Patient now with mobile LV thrombus 14mm x 9mm and aneurysmal apex, EF 50%.     Multiple competing mechanisms in regards to etiology -- highly suspicious for cardioembolic source given LV thrombus however EF 50%. Suspicious for underlying additional hypercoagulable state/coagulopathy. Patient compliant on HAART (states his HIV is "undetectable" per ID) however possible contributory factor of HIV vasculopathy, less likely given no evidence of HIV CNS vasculopathy on imaging.    Discussed with pharmacist--only Eliquis 2.5mg BID or Coumadin are recommended.  Given that Eliquis low dose may not be as effective and levels cannot be monitored, Coumadin is likely a better and safer option--d/w Cardiology who agree.    NEURO:   -Neurologically with unchanged left homonymous hemianopsia, otherwise no focal neurologic deficits   -Continue close monitoring for neurologic deterioration    -Stroke neuro checks q 2 hours, neurologically tolerating heparin gtt, will transition to q4 stroke neuro checks and vital signs.   -3T telemetry   -SBP 80-100mmHg, neurologically stable, HD stable and tolerating- asymptomatic at this time.   -ANTITHROMBOTIC THERAPY: Heparin gtt, ptt goal 60-80, no bolus ; pending recommendations from cardiology regarding optimal agent for long term anticoagulation. Discussed with Pharmacist--Eliquis and Coumadin preferred given lower interactions with his HAART but will require reduced dose Apixaban. Discussed with cardiology team who agree with Coumadin.  Patient will need either eventual FD lovenox bridge to warfarin with INR goal 2-3 if insurance coverage, otherwise will need to bridge to coumadin inpatient.   -MR head as above   -LDL 70 -- no indication for statin therapy at this time  -Dysphagia screen: PASS  -Physical therapy/OT/Speech eval/treatment.   - Left hand numbness secondary to median nerve compression    CARDIOVASCULAR:  -TTE results as above, + LV thrombus  -Cardiology consult appreciated, noted CCTA w/ normal cors.   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 Patent central airways. 5 mm nodule in the right  middle lobe (3/72). No evidence of primary or metastatic malignancy in the chest, abdomen or pelvis. This needs to be followed up closely w/ pcp- appropriate specialists to ensure not clinically significant and/or disease progression.   -Testicular US completed -No evidence of testicular mass.  -LE duplex negative for DVT   -DVT ppx: Hep gtt [x]     PULMONARY:   -protecting airway, saturating well     RENAL:   -BUN/Cr 11.7/0.96, monitor urine output, maintain adequate hydration    -Na Goal:  135-145    ID:   -afebrile, no leukocytosis, monitor for si/sx of infection   -RPR negative   -Pending HIV viral load count     OTHER:    -condition and plan of care d/w patient and RN; questions and concerns addressed. Patient encouraged to establish PCP. Also explained that he will require close f/u for his INR checks.  Explained R/A/B of Coumadin.     DISPOSITION:   -home per PT/OT, patient to speak with his  as there is a noted discrepancy in his insurance policy. Attempting to obtain pcp, will need neuro, ID and heme/onc follow up.  Endocrine as well to ensure TSH trends appropriately slightly elevated but possibly reactive in acute setting.     CORE MEASURES:        Admission NIHSS: 14     Tenecteplase : [x] YES [] NO      LDL/A1C: 70/4.5     Depression Screen- if depression hx and/or present      Statin Therapy: Not indicated      Dysphagia Screen: [x] PASS [] FAIL     Smoking [] YES [x] NO      Afib [] YES [x] NO     Stroke Education [x] YES [] NO    Obtain screening lower extremity venous ultrasound in patients who meet 1 or more of the following criteria as patient is high risk for DVT/PE on admission:   [] History of DVT/PE  []Hypercoagulable states (Factor V Leiden, Cancer, OCP, etc. )  []Prolonged immobility (hemiplegia/hemiparesis/post operative or any other extended immobilization)  [] Transferred from outside facility (Rehab or Long term care)  [x] Age </= to 50

## 2024-03-09 LAB
HIV-1 VIRAL LOAD RESULT: ABNORMAL
HIV1 RNA # SERPL NAA+PROBE: ABNORMAL COPIES/ML
HIV1 RNA SER-IMP: SIGNIFICANT CHANGE UP
HIV1 RNA SERPL NAA+PROBE-ACNC: ABNORMAL
HIV1 RNA SERPL NAA+PROBE-LOG#: ABNORMAL LG COP/ML

## 2024-03-11 LAB
DNA PLOIDY SPEC FC-IMP: SIGNIFICANT CHANGE UP
PTR INTERPRETATION: SIGNIFICANT CHANGE UP

## 2024-03-19 ENCOUNTER — TRANSCRIPTION ENCOUNTER (OUTPATIENT)
Age: 34
End: 2024-03-19

## 2024-05-23 ENCOUNTER — APPOINTMENT (OUTPATIENT)
Dept: NEUROLOGY | Facility: CLINIC | Age: 34
End: 2024-05-23
Payer: MEDICAID

## 2024-05-23 VITALS
WEIGHT: 156 LBS | SYSTOLIC BLOOD PRESSURE: 100 MMHG | HEIGHT: 69 IN | BODY MASS INDEX: 23.11 KG/M2 | OXYGEN SATURATION: 99 % | HEART RATE: 64 BPM | DIASTOLIC BLOOD PRESSURE: 67 MMHG

## 2024-05-23 PROCEDURE — 99214 OFFICE O/P EST MOD 30 MIN: CPT

## 2024-05-23 RX ORDER — WARFARIN 5 MG/1
5 TABLET ORAL
Refills: 0 | Status: ACTIVE | COMMUNITY

## 2024-05-23 RX ORDER — DORAVIRINE 100 MG/1
100 TABLET, FILM COATED ORAL
Refills: 0 | Status: ACTIVE | COMMUNITY

## 2024-05-23 RX ORDER — DOLUTEGRAVIR SODIUM 50 MG/1
50 TABLET, FILM COATED ORAL
Refills: 0 | Status: ACTIVE | COMMUNITY

## 2024-05-23 RX ORDER — LACOSAMIDE 150 MG/1
150 CAPSULE, EXTENDED RELEASE ORAL
Refills: 0 | Status: ACTIVE | COMMUNITY

## 2024-05-23 RX ORDER — EMTRICITABINE AND TENOFOVIR ALAFENAMIDE 200; 25 MG/1; MG/1
200-25 TABLET ORAL
Refills: 0 | Status: ACTIVE | COMMUNITY

## 2024-05-23 RX ORDER — LEVETIRACETAM 1000 MG/1
1000 TABLET, FILM COATED ORAL
Refills: 0 | Status: ACTIVE | COMMUNITY

## 2024-05-23 NOTE — HISTORY OF PRESENT ILLNESS
[FreeTextEntry1] : 32 YO male with history of seizure disorder, HIV (on antiretrovirals) was in [employer] parking lot with wife on 3/4/24 when he "started feeling off," patient's wife noticed and drove him to [facility], where in the parking lot patient had a witnessed seizure (patient supported and placed on ground by security); on awakening, patient was found to have Left sided paralysis, NIH 15. In the hospital CTA head  showed right ica and MCA occlusions. Patient was given tenecteplase by [facility] at ~17:00 on 3/4/24, and was then transferred to [facility] for possible mechanical thrombectomy. Pt had mechanical thrombectomy 3/4/24 in Neuro IR, was found to have a Right Distal M1 Occlusion s/p TICI 3 Recanalization with 1 Aspiration pass. MRI head revealed Moderate region of encephalomalacia involves the right occipital lobe with mild chronic blood products.  Since hospitalization pt reports no new symptoms. He is on Coumadin and reports compliance, last INR was 2.3. Pt is also complaint with HAART therapy. He has been off work since he had the stroke.

## 2024-05-23 NOTE — PHYSICAL EXAM
[FreeTextEntry1] : Awake, alert, oriented x3 speech is fluent, language appropriate EMMA, Left homonomous hemianopsia, face symmetric, tongue midline BLUEs and BLLEs strength is 5/5 proximally and distally Sensation intact to LT throughout FTN intact BL Gait: WNL

## 2024-05-23 NOTE — ASSESSMENT
[FreeTextEntry1] : 32 YO M who was found to have right ICA and MCA occlusion received TNK and was transferred to [facility] for mechanical thrombectomy. Pt underwent thrombectomy with TICI 3 recanalization, since the procedure all the pt's symptoms resolved except left homonymous hemianopsia which pt had for many years prior (old right occipital stroke) - continue Coumadin - continue with INR weekly - stressed to the pt about the need for follow ups - primary, ID, and neurology - ok to go back to work form neurological standpoint - repeat ECHO in 3 months and follow up with cardiology for left ventricular thrombus.  follow up in 12 months and PRN

## 2025-06-10 ENCOUNTER — NON-APPOINTMENT (OUTPATIENT)
Age: 35
End: 2025-06-10

## 2025-06-11 ENCOUNTER — APPOINTMENT (OUTPATIENT)
Dept: NEUROLOGY | Facility: CLINIC | Age: 35
End: 2025-06-11

## 2025-06-11 ENCOUNTER — NON-APPOINTMENT (OUTPATIENT)
Age: 35
End: 2025-06-11

## 2025-06-11 VITALS
HEIGHT: 69 IN | SYSTOLIC BLOOD PRESSURE: 96 MMHG | DIASTOLIC BLOOD PRESSURE: 68 MMHG | BODY MASS INDEX: 24.59 KG/M2 | OXYGEN SATURATION: 98 % | WEIGHT: 166 LBS | HEART RATE: 81 BPM

## 2025-06-11 PROCEDURE — G2211 COMPLEX E/M VISIT ADD ON: CPT | Mod: NC

## 2025-06-11 PROCEDURE — 99214 OFFICE O/P EST MOD 30 MIN: CPT
